# Patient Record
Sex: FEMALE | HISPANIC OR LATINO | Employment: FULL TIME | URBAN - METROPOLITAN AREA
[De-identification: names, ages, dates, MRNs, and addresses within clinical notes are randomized per-mention and may not be internally consistent; named-entity substitution may affect disease eponyms.]

---

## 2022-03-25 ENCOUNTER — OCCMED (OUTPATIENT)
Dept: URGENT CARE | Facility: CLINIC | Age: 42
End: 2022-03-25

## 2022-03-25 ENCOUNTER — APPOINTMENT (OUTPATIENT)
Dept: LAB | Facility: CLINIC | Age: 42
End: 2022-03-25

## 2022-03-25 DIAGNOSIS — Z02.1 PRE-EMPLOYMENT EXAMINATION: Primary | ICD-10-CM

## 2022-03-25 DIAGNOSIS — Z02.1 PRE-EMPLOYMENT EXAMINATION: ICD-10-CM

## 2022-03-25 LAB — RUBV IGG SERPL IA-ACNC: 135.4 IU/ML

## 2022-03-25 PROCEDURE — 86735 MUMPS ANTIBODY: CPT

## 2022-03-25 PROCEDURE — 86765 RUBEOLA ANTIBODY: CPT

## 2022-03-25 PROCEDURE — 86787 VARICELLA-ZOSTER ANTIBODY: CPT

## 2022-03-25 PROCEDURE — 86762 RUBELLA ANTIBODY: CPT

## 2022-03-25 PROCEDURE — 36415 COLL VENOUS BLD VENIPUNCTURE: CPT

## 2022-03-25 PROCEDURE — 86480 TB TEST CELL IMMUN MEASURE: CPT

## 2022-03-27 LAB — VZV IGG SER IA-ACNC: NORMAL

## 2022-03-28 LAB
GAMMA INTERFERON BACKGROUND BLD IA-ACNC: 0.04 IU/ML
M TB IFN-G BLD-IMP: NEGATIVE
M TB IFN-G CD4+ BCKGRND COR BLD-ACNC: -0.02 IU/ML
M TB IFN-G CD4+ BCKGRND COR BLD-ACNC: 0 IU/ML
MEV IGG SER QL: NORMAL
MITOGEN IGNF BCKGRD COR BLD-ACNC: >10 IU/ML
MUV IGG SER QL: ABNORMAL

## 2022-08-18 ENCOUNTER — OFFICE VISIT (OUTPATIENT)
Dept: OBGYN CLINIC | Facility: CLINIC | Age: 42
End: 2022-08-18

## 2022-08-18 VITALS
SYSTOLIC BLOOD PRESSURE: 128 MMHG | BODY MASS INDEX: 31.62 KG/M2 | HEART RATE: 76 BPM | WEIGHT: 171.8 LBS | HEIGHT: 62 IN | DIASTOLIC BLOOD PRESSURE: 87 MMHG

## 2022-08-18 DIAGNOSIS — Z72.51 HIGH RISK HETEROSEXUAL BEHAVIOR: ICD-10-CM

## 2022-08-18 DIAGNOSIS — B96.89 BACTERIAL VAGINOSIS: Primary | ICD-10-CM

## 2022-08-18 DIAGNOSIS — N76.0 BACTERIAL VAGINOSIS: Primary | ICD-10-CM

## 2022-08-18 DIAGNOSIS — R10.2 SUPRAPUBIC ABDOMINAL PAIN: ICD-10-CM

## 2022-08-18 DIAGNOSIS — R39.9 UTI SYMPTOMS: ICD-10-CM

## 2022-08-18 LAB
SL AMB  POCT GLUCOSE, UA: NEGATIVE
SL AMB LEUKOCYTE ESTERASE,UA: NEGATIVE
SL AMB POCT BILIRUBIN,UA: NEGATIVE
SL AMB POCT BLOOD,UA: ABNORMAL
SL AMB POCT CLARITY,UA: ABNORMAL
SL AMB POCT COLOR,UA: ABNORMAL
SL AMB POCT KETONES,UA: NEGATIVE
SL AMB POCT NITRITE,UA: NEGATIVE
SL AMB POCT PH,UA: 5
SL AMB POCT SPECIFIC GRAVITY,UA: 1.03
SL AMB POCT URINE PROTEIN: NEGATIVE
SL AMB POCT UROBILINOGEN: 0.2

## 2022-08-18 PROCEDURE — 81002 URINALYSIS NONAUTO W/O SCOPE: CPT | Performed by: OBSTETRICS & GYNECOLOGY

## 2022-08-18 PROCEDURE — 87491 CHLMYD TRACH DNA AMP PROBE: CPT | Performed by: OBSTETRICS & GYNECOLOGY

## 2022-08-18 PROCEDURE — 87563 M. GENITALIUM AMP PROBE: CPT | Performed by: OBSTETRICS & GYNECOLOGY

## 2022-08-18 PROCEDURE — 87661 TRICHOMONAS VAGINALIS AMPLIF: CPT | Performed by: OBSTETRICS & GYNECOLOGY

## 2022-08-18 PROCEDURE — 87210 SMEAR WET MOUNT SALINE/INK: CPT | Performed by: OBSTETRICS & GYNECOLOGY

## 2022-08-18 PROCEDURE — 87591 N.GONORRHOEAE DNA AMP PROB: CPT | Performed by: OBSTETRICS & GYNECOLOGY

## 2022-08-18 PROCEDURE — 99213 OFFICE O/P EST LOW 20 MIN: CPT | Performed by: OBSTETRICS & GYNECOLOGY

## 2022-08-18 PROCEDURE — 87086 URINE CULTURE/COLONY COUNT: CPT | Performed by: OBSTETRICS & GYNECOLOGY

## 2022-08-18 RX ORDER — HYDROCODONE BITARTRATE AND ACETAMINOPHEN 5; 325 MG/1; MG/1
TABLET ORAL
COMMUNITY
Start: 2022-03-04

## 2022-08-18 RX ORDER — ALBUTEROL SULFATE 0.63 MG/3ML
0.63 SOLUTION RESPIRATORY (INHALATION) EVERY 6 HOURS PRN
COMMUNITY
Start: 2021-10-28 | End: 2022-10-28

## 2022-08-18 RX ORDER — NITROFURANTOIN 25; 75 MG/1; MG/1
100 CAPSULE ORAL 2 TIMES DAILY
Qty: 10 CAPSULE | Refills: 0 | Status: SHIPPED | OUTPATIENT
Start: 2022-08-18 | End: 2022-08-18 | Stop reason: CLARIF

## 2022-08-18 RX ORDER — METRONIDAZOLE 500 MG/1
500 TABLET ORAL EVERY 12 HOURS SCHEDULED
Qty: 14 TABLET | Refills: 0 | Status: SHIPPED | OUTPATIENT
Start: 2022-08-18 | End: 2022-08-25

## 2022-08-18 RX ORDER — CEFUROXIME AXETIL 250 MG/1
TABLET ORAL
COMMUNITY
Start: 2022-06-10

## 2022-08-18 RX ORDER — AMOXICILLIN AND CLAVULANATE POTASSIUM 500; 125 MG/1; MG/1
1 TABLET, FILM COATED ORAL 2 TIMES DAILY
COMMUNITY

## 2022-08-18 NOTE — PROGRESS NOTES
[address]  [facility] OB/GYN VISIT  8/18/2022    Subjective:   Esperanza Arauz is a 39 y o  [id number] with 1 week of symptoms of suprapubic discomfort, worse during intercourse  She reports she gets frequent UTIs and bacterial vaginosis (BV) infections  She can usually tell she has BV by the malodorous vaginal discharge  Since her symptoms began she feels like she is not completely emptying her bladder  She doesn't have dysuria or frequency however  No fevers or chills  She has good vulvovaginal hygiene practices  S/p bilateral tubal ligation for contraception  Objective:  Vitals: Blood pressure 128/87, pulse 76, height 5' 2" (1 575 m), weight 77 9 kg (171 lb 12 8 oz), last menstrual period 07/24/2022  Body mass index is 31 42 kg/m²  Physical Exam  Vitals reviewed  Abdominal:      General: There is no distension  Palpations: Abdomen is soft  Tenderness: There is no abdominal tenderness  There is no guarding or rebound  Genitourinary:     Comments: Normal external female genitalia  No evidence of excoriations to suggest chronic scratching  Cervix is multiparous in appearance with no lesions noted  Off white vaginal discharge coating vaginal walls  Skin:     General: Skin is warm and dry  Neurological:      General: No focal deficit present  Mental Status: She is oriented to person, place, and time     Psychiatric:         Mood and Affect: Mood normal          Behavior: Behavior normal        Recent Results (from the past 24 hour(s))   POCT urine dip    Collection Time: 08/18/22  9:06 AM   Result Value Ref Range    LEUKOCYTE ESTERASE,UA Negative     NITRITE,UA Negative     SL AMB POCT UROBILINOGEN 0 2     POCT URINE PROTEIN Negative      PH,UA 5 0     BLOOD,UA Moderate     SPECIFIC GRAVITY,UA 1 030     KETONES,UA Negative     BILIRUBIN,UA Negative     GLUCOSE, UA Negative      COLOR,UA Dark Yellow     CLARITY,UA Cloudy    Chlamydia/GC amplified DNA by PCR    Collection Time: 08/18/22  9:49 AM Specimen: Cervix   Result Value Ref Range    N gonorrhoeae, DNA Probe Negative Negative    Chlamydia trachomatis, DNA Probe Negative Negative   POCT wet mount    Collection Time: 08/19/22  8:35 AM   Result Value Ref Range    WET MOUNT positive     Yeast, Wet Prep negative     pH 5     Whiff Test positive     Clue Cells present     Trich, Wet Prep negative          Assessment/Plan:  41yo multiparous female with bacterial vaginosis infection and possible UTI  Treat BV now with Metronidazole 500mg BID x 7 days  Await results of urine culture, gonorrhea/chlamydia and trichomoniasis  Problem List Items Addressed This Visit    None     Visit Diagnoses     Bacterial vaginosis    -  Primary    Relevant Medications    amoxicillin-clavulanate (AUGMENTIN) 500-125 mg per tablet    cefuroxime (CEFTIN) 250 mg tablet    metroNIDAZOLE (FLAGYL) 500 mg tablet    UTI symptoms        Relevant Orders    POCT urine dip (Completed)    Urine culture    Suprapubic abdominal pain        Relevant Orders    Urine culture    High risk heterosexual behavior        Relevant Orders    Chlamydia/GC amplified DNA by PCR    Trichomonas vaginalis/Mycoplasma genitalium PCR          Patient discussed with Dr Idania Landry MD  PGY III, OB/GYN  8/18/2022, 12:11 PM

## 2022-08-19 LAB
BV WHIFF TEST VAG QL: POSITIVE
C TRACH DNA SPEC QL NAA+PROBE: NEGATIVE
CLUE CELLS SPEC QL WET PREP: PRESENT
M GENITALIUM DNA SPEC QL NAA+PROBE: NEGATIVE
N GONORRHOEA DNA SPEC QL NAA+PROBE: NEGATIVE
PH SMN: 5 [PH]
SL AMB POCT WET MOUNT: POSITIVE
T VAGINALIS DNA SPEC QL NAA+PROBE: NEGATIVE
T VAGINALIS VAG QL WET PREP: NEGATIVE
YEAST VAG QL WET PREP: NEGATIVE

## 2022-08-20 LAB — BACTERIA UR CULT: NORMAL

## 2023-03-17 ENCOUNTER — OFFICE VISIT (OUTPATIENT)
Dept: OBGYN CLINIC | Facility: CLINIC | Age: 43
End: 2023-03-17

## 2023-03-17 VITALS
BODY MASS INDEX: 30.73 KG/M2 | DIASTOLIC BLOOD PRESSURE: 84 MMHG | HEIGHT: 62 IN | WEIGHT: 167 LBS | SYSTOLIC BLOOD PRESSURE: 119 MMHG | HEART RATE: 78 BPM

## 2023-03-17 DIAGNOSIS — B96.89 BV (BACTERIAL VAGINOSIS): Primary | ICD-10-CM

## 2023-03-17 DIAGNOSIS — N30.01 ACUTE CYSTITIS WITH HEMATURIA: ICD-10-CM

## 2023-03-17 DIAGNOSIS — N76.0 BV (BACTERIAL VAGINOSIS): Primary | ICD-10-CM

## 2023-03-17 DIAGNOSIS — R39.15 URGENCY OF URINATION: ICD-10-CM

## 2023-03-17 DIAGNOSIS — N89.8 VAGINAL DISCHARGE: ICD-10-CM

## 2023-03-17 LAB
BV WHIFF TEST VAG QL: POSITIVE
CLUE CELLS SPEC QL WET PREP: POSITIVE
PH SMN: 5.5 [PH]
SL AMB  POCT GLUCOSE, UA: NEGATIVE
SL AMB LEUKOCYTE ESTERASE,UA: ABNORMAL
SL AMB POCT BILIRUBIN,UA: NEGATIVE
SL AMB POCT BLOOD,UA: ABNORMAL
SL AMB POCT CLARITY,UA: ABNORMAL
SL AMB POCT COLOR,UA: YELLOW
SL AMB POCT KETONES,UA: NEGATIVE
SL AMB POCT NITRITE,UA: NEGATIVE
SL AMB POCT PH,UA: 6
SL AMB POCT SPECIFIC GRAVITY,UA: 1.02
SL AMB POCT URINE PROTEIN: NEGATIVE
SL AMB POCT UROBILINOGEN: 0.2
SL AMB POCT WET MOUNT: ABNORMAL
T VAGINALIS VAG QL WET PREP: NEGATIVE
YEAST VAG QL WET PREP: NEGATIVE

## 2023-03-17 RX ORDER — NITROFURANTOIN 25; 75 MG/1; MG/1
100 CAPSULE ORAL 2 TIMES DAILY
Qty: 10 CAPSULE | Refills: 0 | Status: SHIPPED | OUTPATIENT
Start: 2023-03-17 | End: 2023-03-22

## 2023-03-17 RX ORDER — MULTIVITAMIN
1 TABLET ORAL DAILY
COMMUNITY

## 2023-03-17 RX ORDER — CHLORAL HYDRATE 500 MG
2000 CAPSULE ORAL DAILY
COMMUNITY

## 2023-03-17 RX ORDER — METRONIDAZOLE 500 MG/1
500 TABLET ORAL 2 TIMES DAILY
Qty: 14 TABLET | Refills: 0 | Status: SHIPPED | OUTPATIENT
Start: 2023-03-17 | End: 2023-03-24

## 2023-03-17 RX ORDER — LANOLIN ALCOHOL/MO/W.PET/CERES
2000 CREAM (GRAM) TOPICAL DAILY
COMMUNITY

## 2023-03-17 RX ORDER — MULTIVIT-MIN/IRON/FOLIC ACID/K 18-600-40
2000 CAPSULE ORAL
COMMUNITY

## 2023-03-17 NOTE — PROGRESS NOTES
PROBLEM GYNECOLOGICAL VISIT    Jose E Mcgarry is a 43 y o  female who presents today with complaint of possible UTI and vaginitis  Her general medical history has been reviewed and she reports it as follows:    History reviewed  No pertinent past medical history  Past Surgical History:   Procedure Laterality Date   • BREAST CYST EXCISION Left    • CERVIX LESION DESTRUCTION     • TUBAL LIGATION       OB History        5    Para   3    Term   3            AB   2    Living   3       SAB        IAB   2    Ectopic        Multiple        Live Births   3               Social History     Tobacco Use   • Smoking status: Former     Types: Cigarettes     Quit date: 3/13/2023     Years since quittin 0   • Smokeless tobacco: Former   Vaping Use   • Vaping Use: Never used   Substance Use Topics   • Alcohol use: Yes     Comment: Occasionally   • Drug use: Never     Social History     Substance and Sexual Activity   Sexual Activity Yes   • Partners: Male   • Birth control/protection: None       Current Outpatient Medications   Medication Instructions   • amoxicillin-clavulanate (AUGMENTIN) 500-125 mg per tablet 1 tablet, 2 times daily   • cefuroxime (CEFTIN) 250 mg tablet TAKE 1 TABLET BY MOUTH TWICE A DAY UNTIL DONE   • fish oil 2,000 mg, Oral, Daily   • HYDROcodone-acetaminophen (NORCO) 5-325 mg per tablet For moderate to severe pain, take 1 tab prn q6 hours day 1; 1 tab prn q 12 hours days 2; 1 tab prn once day 3    • metroNIDAZOLE (FLAGYL) 500 mg, Oral, 2 times daily   • Multiple Vitamin (multivitamin) tablet 1 tablet, Oral, Daily   • Multiple Vitamins-Minerals (ONE DAILY MULTIVITAMIN WOMEN PO) Oral   • nitrofurantoin (MACROBID) 100 mg, Oral, 2 times daily   • tuberculin 5 units/0 1 mL 0 1 mL   • vitamin B-12 (VITAMIN B-12) 2,000 mcg, Oral, Daily   • Vitamin D 2,000 Units, Oral       History of Present Illness:   Lex Curtis presents today reporting a few day history of suspected UTI and BV   She has a history of these infections in the past  She reports a milky white discharge with a foul fishy odor  She also has urinary frequency/urgency, feels that as soon as she voids she immediately needs to go again  Has the sensation of incomplete bladder emptying  No dysuria, flank pain, fever  She reports that the day prior to the symptoms starting she used a new scented soap on her vulva, believes this caused the infection as she has had issues with vaginitis/UTI in the past after using products with fragrance  Review of Systems:  Review of Systems   Constitutional: Negative  Gastrointestinal: Negative  Genitourinary: Positive for frequency, urgency and vaginal discharge  Physical Exam:  /84 (BP Location: Right arm, Patient Position: Sitting, Cuff Size: Large)   Pulse 78   Ht 5' 2" (1 575 m)   Wt 75 8 kg (167 lb)   LMP 02/25/2023 (Approximate)   BMI 30 54 kg/m²   Physical Exam  Constitutional:       General: She is not in acute distress  Appearance: Normal appearance  Genitourinary:      Vulva normal       No lesions in the vagina  Vaginal discharge present  Right Adnexa: not tender, not full and no mass present  Left Adnexa: not tender, not full and no mass present  No cervical motion tenderness or lesion  Abdominal:      Tenderness: There is no right CVA tenderness or left CVA tenderness  Neurological:      Mental Status: She is alert  Skin:     General: Skin is warm and dry  Psychiatric:         Mood and Affect: Mood normal          Behavior: Behavior normal    Vitals reviewed  Point of Care Testing:   -Wet mount: +clue cells, -yeast, -trich   -KOH mount: -yeast   -Whiff: positive    -pH 5 5   -urine dip: moderate leuks, small blood, dark/cloudy     Assessment:   1  Bacterial vaginosis    2  Acute cystitis     Plan:   1  Rx for flagy for BV, Macrobid for UTI      2  Reviewed vulvar skin care meausres, encouraged to avoid all scented products to the area     3  Warning signs reviewed    4  Return for annual exam or sooner if needed     Reviewed with patient that test results are available in MyChart immediately, but that they will not necessarily be reviewed by me immediately  Explained that I will review results at my earliest opportunity and contact patient appropriately

## 2023-03-19 LAB — BACTERIA UR CULT: ABNORMAL

## 2023-08-04 ENCOUNTER — NURSE TRIAGE (OUTPATIENT)
Dept: OTHER | Facility: OTHER | Age: 43
End: 2023-08-04

## 2023-08-04 NOTE — TELEPHONE ENCOUNTER
Triage RN has been unsuccessful to follow up with patient for report of pelvic pain and discomfort. Please follow up with patient. Reason for Disposition  • Third attempt to contact caller AND no contact made. Phone number verified.     Protocols used: NO CONTACT OR DUPLICATE CONTACT CALL-ADULT-[place]

## 2023-08-04 NOTE — TELEPHONE ENCOUNTER
Regarding: pelvic discomfort and sharp pains  ----- Message from Marie Flores sent at 8/4/2023  7:49 AM EDT -----  "I have been having pelvic discomfort and sharp pains. "

## 2023-08-08 ENCOUNTER — APPOINTMENT (EMERGENCY)
Dept: RADIOLOGY | Facility: HOSPITAL | Age: 43
End: 2023-08-08
Payer: COMMERCIAL

## 2023-08-08 ENCOUNTER — HOSPITAL ENCOUNTER (EMERGENCY)
Facility: HOSPITAL | Age: 43
Discharge: HOME | End: 2023-08-09
Attending: EMERGENCY MEDICINE | Admitting: EMERGENCY MEDICINE
Payer: COMMERCIAL

## 2023-08-08 VITALS
HEART RATE: 55 BPM | RESPIRATION RATE: 16 BRPM | SYSTOLIC BLOOD PRESSURE: 122 MMHG | TEMPERATURE: 97.1 F | DIASTOLIC BLOOD PRESSURE: 77 MMHG | OXYGEN SATURATION: 96 %

## 2023-08-08 DIAGNOSIS — N72 CERVICITIS: Primary | ICD-10-CM

## 2023-08-08 LAB
ALBUMIN SERPL BCP-MCNC: 3.4 G/DL (ref 3.5–5)
ALP SERPL-CCNC: 83 U/L (ref 46–116)
ALT SERPL W P-5'-P-CCNC: 31 U/L (ref 12–78)
ANION GAP SERPL CALCULATED.3IONS-SCNC: 4 MMOL/L
AST SERPL W P-5'-P-CCNC: 17 U/L (ref 5–45)
ATRIAL RATE: 58 BPM
B-HCG SERPL-ACNC: <1 MIU/ML (ref 0–5)
BACTERIA UR QL AUTO: ABNORMAL /HPF
BASOPHILS # BLD AUTO: 0.04 THOUSANDS/ÂΜL (ref 0–0.1)
BASOPHILS NFR BLD AUTO: 1 % (ref 0–1)
BILIRUB SERPL-MCNC: 0.18 MG/DL (ref 0.2–1)
BILIRUB UR QL STRIP: NEGATIVE
BUN SERPL-MCNC: 12 MG/DL (ref 5–25)
CALCIUM ALBUM COR SERPL-MCNC: 9.3 MG/DL (ref 8.3–10.1)
CALCIUM SERPL-MCNC: 8.8 MG/DL (ref 8.3–10.1)
CHLORIDE SERPL-SCNC: 111 MMOL/L (ref 96–108)
CLARITY UR: CLEAR
CO2 SERPL-SCNC: 25 MMOL/L (ref 21–32)
COLOR UR: YELLOW
CREAT SERPL-MCNC: 0.67 MG/DL (ref 0.6–1.3)
EOSINOPHIL # BLD AUTO: 0.13 THOUSAND/ÂΜL (ref 0–0.61)
EOSINOPHIL NFR BLD AUTO: 2 % (ref 0–6)
ERYTHROCYTE [DISTWIDTH] IN BLOOD BY AUTOMATED COUNT: 12.4 % (ref 11.6–15.1)
EXT PREGNANCY TEST URINE: NEGATIVE
EXT. CONTROL: NORMAL
GFR SERPL CREATININE-BSD FRML MDRD: 108 ML/MIN/1.73SQ M
GLUCOSE SERPL-MCNC: 126 MG/DL (ref 65–140)
GLUCOSE UR STRIP-MCNC: NEGATIVE MG/DL
HCT VFR BLD AUTO: 38.1 % (ref 34.8–46.1)
HGB BLD-MCNC: 12.9 G/DL (ref 11.5–15.4)
HGB UR QL STRIP.AUTO: ABNORMAL
IMM GRANULOCYTES # BLD AUTO: 0.02 THOUSAND/UL (ref 0–0.2)
IMM GRANULOCYTES NFR BLD AUTO: 0 % (ref 0–2)
KETONES UR STRIP-MCNC: NEGATIVE MG/DL
LEUKOCYTE ESTERASE UR QL STRIP: NEGATIVE
LYMPHOCYTES # BLD AUTO: 2.76 THOUSANDS/ÂΜL (ref 0.6–4.47)
LYMPHOCYTES NFR BLD AUTO: 42 % (ref 14–44)
MCH RBC QN AUTO: 31.3 PG (ref 26.8–34.3)
MCHC RBC AUTO-ENTMCNC: 33.9 G/DL (ref 31.4–37.4)
MCV RBC AUTO: 93 FL (ref 82–98)
MONOCYTES # BLD AUTO: 0.67 THOUSAND/ÂΜL (ref 0.17–1.22)
MONOCYTES NFR BLD AUTO: 10 % (ref 4–12)
MUCOUS THREADS UR QL AUTO: ABNORMAL
NEUTROPHILS # BLD AUTO: 3.03 THOUSANDS/ÂΜL (ref 1.85–7.62)
NEUTS SEG NFR BLD AUTO: 45 % (ref 43–75)
NITRITE UR QL STRIP: NEGATIVE
NON-SQ EPI CELLS URNS QL MICRO: ABNORMAL /HPF
NRBC BLD AUTO-RTO: 0 /100 WBCS
P AXIS: 44 DEGREES
PH UR STRIP.AUTO: 6.5 [PH]
PLATELET # BLD AUTO: 322 THOUSANDS/UL (ref 149–390)
PMV BLD AUTO: 8.7 FL (ref 8.9–12.7)
POTASSIUM SERPL-SCNC: 3.5 MMOL/L (ref 3.5–5.3)
PR INTERVAL: 136 MS
PROT SERPL-MCNC: 6.9 G/DL (ref 6.4–8.4)
PROT UR STRIP-MCNC: NEGATIVE MG/DL
QRS AXIS: 24 DEGREES
QRSD INTERVAL: 82 MS
QT INTERVAL: 406 MS
QTC INTERVAL: 398 MS
RBC # BLD AUTO: 4.12 MILLION/UL (ref 3.81–5.12)
RBC #/AREA URNS AUTO: ABNORMAL /HPF
SODIUM SERPL-SCNC: 140 MMOL/L (ref 135–147)
SP GR UR STRIP.AUTO: 1.02 (ref 1–1.03)
T WAVE AXIS: 28 DEGREES
UROBILINOGEN UR STRIP-ACNC: <2 MG/DL
VENTRICULAR RATE: 58 BPM
WBC # BLD AUTO: 6.65 THOUSAND/UL (ref 4.31–10.16)
WBC #/AREA URNS AUTO: ABNORMAL /HPF

## 2023-08-08 PROCEDURE — 85025 COMPLETE CBC W/AUTO DIFF WBC: CPT

## 2023-08-08 PROCEDURE — 74177 CT ABD & PELVIS W/CONTRAST: CPT

## 2023-08-08 PROCEDURE — 87591 N.GONORRHOEAE DNA AMP PROB: CPT

## 2023-08-08 PROCEDURE — 84702 CHORIONIC GONADOTROPIN TEST: CPT

## 2023-08-08 PROCEDURE — 96374 THER/PROPH/DIAG INJ IV PUSH: CPT

## 2023-08-08 PROCEDURE — 80053 COMPREHEN METABOLIC PANEL: CPT

## 2023-08-08 PROCEDURE — 93005 ELECTROCARDIOGRAM TRACING: CPT

## 2023-08-08 PROCEDURE — 87491 CHLMYD TRACH DNA AMP PROBE: CPT

## 2023-08-08 PROCEDURE — 81001 URINALYSIS AUTO W/SCOPE: CPT

## 2023-08-08 PROCEDURE — 93010 ELECTROCARDIOGRAM REPORT: CPT | Performed by: INTERNAL MEDICINE

## 2023-08-08 PROCEDURE — 36415 COLL VENOUS BLD VENIPUNCTURE: CPT

## 2023-08-08 PROCEDURE — G1004 CDSM NDSC: HCPCS

## 2023-08-08 PROCEDURE — 99285 EMERGENCY DEPT VISIT HI MDM: CPT | Performed by: EMERGENCY MEDICINE

## 2023-08-08 PROCEDURE — 81025 URINE PREGNANCY TEST: CPT

## 2023-08-08 PROCEDURE — 99284 EMERGENCY DEPT VISIT MOD MDM: CPT

## 2023-08-08 RX ORDER — DOXYCYCLINE HYCLATE 100 MG/1
100 CAPSULE ORAL ONCE
Status: COMPLETED | OUTPATIENT
Start: 2023-08-08 | End: 2023-08-08

## 2023-08-08 RX ORDER — ACETAMINOPHEN 325 MG/1
975 TABLET ORAL ONCE
Status: COMPLETED | OUTPATIENT
Start: 2023-08-08 | End: 2023-08-08

## 2023-08-08 RX ORDER — KETOROLAC TROMETHAMINE 30 MG/ML
15 INJECTION, SOLUTION INTRAMUSCULAR; INTRAVENOUS ONCE
Status: COMPLETED | OUTPATIENT
Start: 2023-08-08 | End: 2023-08-08

## 2023-08-08 RX ADMIN — ACETAMINOPHEN 975 MG: 325 TABLET, FILM COATED ORAL at 21:08

## 2023-08-08 RX ADMIN — DOXYCYCLINE 100 MG: 100 CAPSULE ORAL at 23:49

## 2023-08-08 RX ADMIN — IOHEXOL 97 ML: 350 INJECTION, SOLUTION INTRAVENOUS at 22:06

## 2023-08-08 RX ADMIN — KETOROLAC TROMETHAMINE 15 MG: 30 INJECTION, SOLUTION INTRAMUSCULAR; INTRAVENOUS at 21:08

## 2023-08-09 LAB
C TRACH DNA SPEC QL NAA+PROBE: NEGATIVE
N GONORRHOEA DNA SPEC QL NAA+PROBE: NEGATIVE

## 2023-08-09 PROCEDURE — 96372 THER/PROPH/DIAG INJ SC/IM: CPT

## 2023-08-09 RX ORDER — NAPROXEN 500 MG/1
500 TABLET ORAL 2 TIMES DAILY WITH MEALS
Qty: 14 TABLET | Refills: 0 | Status: SHIPPED | OUTPATIENT
Start: 2023-08-09 | End: 2023-08-16

## 2023-08-09 RX ORDER — DOXYCYCLINE HYCLATE 100 MG/1
100 CAPSULE ORAL 2 TIMES DAILY
Qty: 14 CAPSULE | Refills: 0 | Status: SHIPPED | OUTPATIENT
Start: 2023-08-09 | End: 2023-08-16

## 2023-08-09 RX ADMIN — CEFTRIAXONE 500 MG: 1 INJECTION, POWDER, FOR SOLUTION INTRAMUSCULAR; INTRAVENOUS at 00:10

## 2023-08-09 NOTE — DISCHARGE INSTRUCTIONS
Your evaluation suggests that your symptoms are due to a non emergent cause, most consistent with cervicitis. Please follow up with your OBGYN within two days. Take antibiotics as prescribed. Return to the Emergency Department if you experience worsening or concerning symptoms. Thank you for choosing us for your care.

## 2023-08-09 NOTE — ED ATTENDING ATTESTATION
8/8/2023  IPrecious Friday, DO, saw and evaluated the patient. I have discussed the patient with the resident/non-physician practitioner and agree with the resident's/non-physician practitioner's findings, Plan of Care, and MDM as documented in the resident's/non-physician practitioner's note, except where noted. All available labs and Radiology studies were reviewed. I was present for key portions of any procedure(s) performed by the resident/non-physician practitioner and I was immediately available to provide assistance. At this point I agree with the current assessment done in the Emergency Department. I have conducted an independent evaluation of this patient a history and physical is as follows:    Patient is a 44-year-old female history of prior bacterial vaginosis, previous tubal ligation, says that about 2 days ago she began having some mild left sided abdominal pain, lower abdominal, No dysuria hematuria, no vaginal bleeding or discharge. No dyspareunia. No nausea, no vomiting, no diarrhea constipation. Pain is mild in severity. Patient says when the pain gets worse she feels short of breath from the pain but that she does not actually have any short of breath when she does not have pain. There Is no chest pain. General:  Patient is well-appearing  Head:  Atraumatic  Eyes:  Conjunctiva pink  ENT:  Mucous membranes are moist  Neck:  Supple  Cardiac:  S1-S2, without murmurs  Lungs:  Clear to auscultation bilaterally  Abdomen: Very slightly low left abdominal tenderness, no tympany, no rigidity, no guarding, no CVA tenderness. Extremities:  Normal range of motion  Neurologic:  Awake, fluent speech, normal comprehension, AAOx3  Skin:  Pink warm and dry  Psychiatric:  Alert, pleasant, cooperative      ED Course       CT abdomen pelvis with contrast   Final Result      No acute CT findings.             Workstation performed: [id number]             Labs Reviewed   CBC AND DIFFERENTIAL - Abnormal Result Value Ref Range Status    WBC 6.65  4.31 - 10.16 Thousand/uL Final    RBC 4.12  3.81 - 5.12 Million/uL Final    Hemoglobin 12.9  11.5 - 15.4 g/dL Final    Hematocrit 38.1  34.8 - 46.1 % Final    MCV 93  82 - 98 fL Final    MCH 31.3  26.8 - 34.3 pg Final    MCHC 33.9  31.4 - 37.4 g/dL Final    RDW 12.4  11.6 - 15.1 % Final    MPV 8.7 (*) 8.9 - 12.7 fL Final    Platelets 067  631 - 390 Thousands/uL Final    nRBC 0  /100 WBCs Final    Neutrophils Relative 45  43 - 75 % Final    Immat GRANS % 0  0 - 2 % Final    Lymphocytes Relative 42  14 - 44 % Final    Monocytes Relative 10  4 - 12 % Final    Eosinophils Relative 2  0 - 6 % Final    Basophils Relative 1  0 - 1 % Final    Neutrophils Absolute 3.03  1.85 - 7.62 Thousands/µL Final    Immature Grans Absolute 0.02  0.00 - 0.20 Thousand/uL Final    Lymphocytes Absolute 2.76  0.60 - 4.47 Thousands/µL Final    Monocytes Absolute 0.67  0.17 - 1.22 Thousand/µL Final    Eosinophils Absolute 0.13  0.00 - 0.61 Thousand/µL Final    Basophils Absolute 0.04  0.00 - 0.10 Thousands/µL Final   COMPREHENSIVE METABOLIC PANEL - Abnormal    Sodium 140  135 - 147 mmol/L Final    Potassium 3.5  3.5 - 5.3 mmol/L Final    Chloride 111 (*) 96 - 108 mmol/L Final    CO2 25  21 - 32 mmol/L Final    ANION GAP 4  mmol/L Final    BUN 12  5 - 25 mg/dL Final    Creatinine 0.67  0.60 - 1.30 mg/dL Final    Comment: Standardized to IDMS reference method    Glucose 126  65 - 140 mg/dL Final    Comment: If the patient is fasting, the ADA then defines impaired fasting glucose as > 100 mg/dL and diabetes as > or equal to 123 mg/dL. Specimen collection should occur prior to Sulfasalazine administration due to the potential for falsely depressed results. Specimen collection should occur prior to Sulfapyridine administration due to the potential for falsely elevated results.     Calcium 8.8  8.3 - 10.1 mg/dL Final    Corrected Calcium 9.3  8.3 - 10.1 mg/dL Final    AST 17  5 - 45 U/L Final Comment: Specimen collection should occur prior to Sulfasalazine administration due to the potential for falsely depressed results. ALT 31  12 - 78 U/L Final    Comment: Specimen collection should occur prior to Sulfasalazine and/or Sulfapyridine administration due to the potential for falsely depressed results. Alkaline Phosphatase 83  46 - 116 U/L Final    Total Protein 6.9  6.4 - 8.4 g/dL Final    Albumin 3.4 (*) 3.5 - 5.0 g/dL Final    Total Bilirubin 0.18 (*) 0.20 - 1.00 mg/dL Final    Comment: Use of this assay is not recommended for patients undergoing treatment with eltrombopag due to the potential for falsely elevated results.     eGFR 108  ml/min/1.73sq m Final    Narrative:     National Kid[employer] Disease Foundation guidelines for Chronic Kidney Disease (CKD):   •  Stage 1 with normal or high GFR (GFR > 90 mL/min/1.73 square meters)  •  Stage 2 Mild CKD (GFR = 60-89 mL/min/1.73 square meters)  •  Stage 3A Moderate CKD (GFR = 45-59 mL/min/1.73 square meters)  •  Stage 3B Moderate CKD (GFR = 30-44 mL/min/1.73 square meters)  •  Stage 4 Severe CKD (GFR = 15-29 mL/min/1.73 square meters)  •  Stage 5 End Stage CKD (GFR <15 mL/min/1.73 square meters)  Note: GFR calculation is accurate only with a steady state creatinine   UA W REFLEX TO MICROSCOPIC WITH REFLEX TO CULTURE - Abnormal    Color, UA Yellow   Final    Clarity, UA Clear   Final    Specific Gravity, UA 1.025  1.005 - 1.030 Final    pH, UA 6.5  4.5, 5.0, 5.5, 6.0, 6.5, 7.0, 7.5, 8.0 Final    Leukocytes, UA Negative  Negative Final    Nitrite, UA Negative  Negative Final    Protein, UA Negative  Negative mg/dl Final    Glucose, UA Negative  Negative mg/dl Final    Ketones, UA Negative  Negative mg/dl Final    Urobilinogen, UA <2.0  <2.0 mg/dl mg/dl Final    Bilirubin, UA Negative  Negative Final    Occult Blood, UA Small (*) Negative Final   URINE MICROSCOPIC - Abnormal    RBC, UA 10-20 (*) None Seen, 1-2 /hpf Final    WBC, UA None Seen  None Seen, 1-2 /hpf Final    Epithelial Cells Occasional  None Seen, Occasional /hpf Final    Bacteria, UA None Seen  None Seen, Occasional /hpf Final    MUCUS THREADS Occasional (*) None Seen Final   HCG, QUANTITATIVE - Normal    HCG, Quant <1  0 - 5 mIU/mL Final    Narrative:      Expected Ranges:    HCG results between 5 and 25 mIU/mL may be indicative of early pregnancy but should be interpreted in light of the total clinical presentation. HCG can rise to detectable levels in carmina and post menopausal women (0-11.6 mIU/mL). Approximate               Approximate HCG  Gestation age          Concentration ( mIU/mL)  _____________          ______________________   Jaci Fitzpatrick                      HCG values  0.2-1                       5-50  1-2                           2-3                         100-5000  3-4                         [id number]  4-5                         [id number]  5-6                         [id number]  6-8                         [id number]  8-12                        [id number]     POCT PREGNANCY, URINE - Normal    EXT Preg Test, Ur Negative   Final    Control Valid   Final   CHLAMYDIA /GC AMPLIFIED DNA       Pelvic exam performed by ED resident showed a scant amount of brownish discharge, trace cervical tenderness, no other significant abnormalities. At this point the cause the patient's symptoms is unclear but unlikely to represent an acute emergency. No evidence of ectopic pregnancy, no sign of of diverticulitis. No urinary symptoms, no signs of pyelonephritis or UTI. May have an element of cervicitis however no significant discharge and no large amount of cervical motion tenderness. Patient given, treatment for cervicitis, will follow up as outpatient. While the cause of the patient's complaints is most likely benign, it is possible that this is the early presentation of a more serious condition.  This diagnostic uncertainty was discussed with the patient, as was the importance of follow up care, as well as the need to return to immediately return to the closest emergency department for the signs/symptoms in the discharge instruction sheets, or they were otherwise concerned about their medical condition. The patient stated they were aware of this diagnostic uncertainty, understood the importance of follow up and were comfortable being discharged. Supportive care, importance of follow-up and return precautions were discussed with the patient, who expressed understanding. DIAGNOSIS:  Acute nonspecific left lower abdominal pain    MEDICAL DECISION MAKING CODING      Patient presents with acute new problem with:  Threat to life or bodily function      Chronic conditions affecting care: As per HPI    COLLECTION AND INTERPRETATION OF DATA    I ordered each unique test  Tests reviewed personally by me:  Labs: See above  Imaging: I independently reviewed the CT abdomen and pelvis and found no acute pathology. RISK  Drugs (OTC, Rx, Controlled substances): preScription management  All of the patient's current prescription medications should be continued.       Surgery  -I considered surgery may be necessary prior to completion of the work up but afterwards there is no indication for immediate surgery    Social Determinants of Health:  Presentation to ED outside of business hours or on night shift          Critical Care Time  Procedures

## 2023-08-09 NOTE — ED PROVIDER NOTES
History  Chief Complaint   Patient presents with   • Generalized Body Aches     Pt reports sob, tissues in urine, body pain, weakness; started 2 days ago     Patient is a 51-year-old female with a significant past medical history of tubal ligation, presenting for evaluation of lower abdominal pain. She states that over the past 2 days she has been having some lower abdominal cramping type sensation with some radiation into her lower back. She endorses nausea without any vomiting. She also has some dull, generalized headaches. She states that she occasionally feels short of breath while experiencing increase in pain, but is not having any at rest or with exertion. She describes some myalgias as well. She denies any dysuria, hematuria, or flank pain. She denies fevers. She denies sick contacts. She otherwise denies acute complaints. Prior to Admission Medications   Prescriptions Last Dose Informant Patient Reported? Taking? Cholecalciferol (Vitamin D) 50 MCG ( UT) CAPS  Self Yes No   Sig: Take 2,000 Units by mouth   HYDROcodone-acetaminophen (NORCO) 5-325 mg per tablet   Yes No   Sig: For moderate to severe pain, take 1 tab prn q6 hours day 1; 1 tab prn q 12 hours days 2; 1 tab prn once day 3.    Patient not taking: Reported on 2022   Multiple Vitamin (multivitamin) tablet  Self Yes No   Sig: Take 1 tablet by mouth daily   Multiple Vitamins-Minerals (ONE DAILY MULTIVITAMIN WOMEN PO)  Self Yes No   Sig: Take by mouth   Omega-3 Fatty Acids (fish oil) 1,000 mg  Self Yes No   Sig: Take 2,000 mg by mouth daily   amoxicillin-clavulanate (AUGMENTIN) 500-125 mg per tablet   Yes No   Sig: Take 1 tablet by mouth 2 (two) times a day   Patient not taking: Reported on 2022   cefuroxime (CEFTIN) 250 mg tablet   Yes No   Sig: TAKE 1 TABLET BY MOUTH TWICE A DAY UNTIL DONE   Patient not taking: Reported on 2022   tuberculin 5 units/0.1 mL   Yes No   Si.1 mL   Patient not taking: Reported on 2022   vitamin B-12 (VITAMIN B-12) 1,000 mcg tablet  Self Yes No   Sig: Take 2,000 mcg by mouth daily      Facility-Administered Medications: None       No past medical history on file. Past Surgical History:   Procedure Laterality Date   • BREAST CYST EXCISION Left    • CERVIX LESION DESTRUCTION     • TUBAL LIGATION         Family History   Problem Relation Age of Onset   • Hypertension Mother    • Irregular heart beat Mother    • Heart disease Mother    • Liver disease Mother    • Hypertension Father    • Diabetes Father    • Hypothyroidism Sister    • Pancreatitis Sister    • Anxiety disorder Sister    • Depression Sister    • Depression Sister    • Anxiety disorder Sister    • No Known Problems Sister    • Heart disease Brother    • Drug abuse Brother    • No Known Problems Maternal Grandmother    • No Known Problems Maternal Grandfather    • No Known Problems Paternal Grandmother    • No Known Problems Paternal Grandfather    • Breast cancer Cousin    • Breast cancer Cousin      I have reviewed and agree with the history as documented. E-Cigarette/Vaping   • E-Cigarette Use Never User      E-Cigarette/Vaping Substances   • Nicotine No    • THC No    • CBD No    • Flavoring No      Social History     Tobacco Use   • Smoking status: Former     Types: Cigarettes     Quit date: 3/13/2023     Years since quittin.4   • Smokeless tobacco: Former   Vaping Use   • Vaping Use: Never used   Substance Use Topics   • Alcohol use: Yes     Comment: Occasionally   • Drug use: Never        Review of Systems   Constitutional: Negative for fever. Respiratory: Positive for shortness of breath (with pain from abdomen). Gastrointestinal: Positive for abdominal pain and nausea. Negative for diarrhea and vomiting. Musculoskeletal: Positive for myalgias. Neurological: Positive for headaches. All other systems reviewed and are negative.       Physical Exam  ED Triage Vitals   Temperature Pulse Respirations Blood Pressure SpO2   08/08/23 1746 08/08/23 1746 08/08/23 1825 08/08/23 1746 08/08/23 1746   (!) 97.1 °F (36.2 °C) 71 18 119/90 99 %      Temp Source Heart Rate Source Patient Position - Orthostatic VS BP Location FiO2 (%)   08/08/23 1746 08/08/23 1746 08/08/23 1746 08/08/23 1746 --   Oral Monitor Lying Left arm       Pain Score       08/08/23 1746       9             Orthostatic Vital Signs  Vitals:    08/08/23 1746 08/08/23 2229   BP: 119/90 122/77   Pulse: 71 55   Patient Position - Orthostatic VS: Lying Lying       Physical Exam  Vitals and nursing note reviewed. Exam conducted with a chaperone present [facility]). Constitutional:       General: She is not in acute distress. Appearance: Normal appearance. She is not ill-appearing or toxic-appearing. HENT:      Head: Normocephalic and atraumatic. Right Ear: External ear normal.      Left Ear: External ear normal.      Nose: Nose normal.   Eyes:      General: No scleral icterus. Right eye: No discharge. Left eye: No discharge. Extraocular Movements: Extraocular movements intact. Conjunctiva/sclera: Conjunctivae normal.   Cardiovascular:      Rate and Rhythm: Normal rate. Heart sounds: Normal heart sounds. No murmur heard. No friction rub. No gallop. Pulmonary:      Effort: Pulmonary effort is normal. No respiratory distress. Breath sounds: Normal breath sounds. Abdominal:      General: Abdomen is flat. There is no distension. Palpations: Abdomen is soft. There is no mass. Tenderness: There is abdominal tenderness in the suprapubic area. There is no right CVA tenderness, left CVA tenderness or guarding. Genitourinary:     Comments: There is minimal blood in the vaginal vault. No significant discharge noted. Minimal cervical motion tenderness. Skin:     General: Skin is warm and dry. Neurological:      General: No focal deficit present. Mental Status: She is alert. ED Medications  Medications   ketorolac (TORADOL) injection 15 mg (15 mg Intravenous Given 8/8/23 2108)   acetaminophen (TYLENOL) tablet 975 mg (975 mg Oral Given 8/8/23 2108)   iohexol (OMNIPAQUE) 350 MG/ML injection (MULTI-DOSE) 100 mL (97 mL Intravenous Given 8/8/23 2206)   doxycycline hyclate (VIBRAMYCIN) capsule 100 mg (100 mg Oral Given 8/8/23 2349)   cefTRIAXone (ROCEPHIN) 500 mg in sterile water IM only syringe (500 mg Intramuscular Given 8/9/23 0010)       Diagnostic Studies  Results Reviewed     Procedure Component Value Units Date/Time    Chlamydia/GC amplified DNA by PCR [[id number]]  (Normal) Collected: 08/08/23 2336    Lab Status: Final result Specimen: Urine, Other Updated: 08/09/23 1539     N gonorrhoeae, DNA Probe Negative     Chlamydia trachomatis, DNA Probe Negative    Narrative: This test was performed using the FDA-approved Jackie 6800 CT/NG assay (Roche [employer]). This test uses real-time PCR to detect Chlamydia trachomatis (CT) and Neisseria gonorrhoeae (NG). This instrument and assay have been validated by the  and performing laboratory and verified by the performing laboratory. This test is intended as an aid in the diagnosis of chlamydial and gonococcal disease. This test has not been evaluated in patients younger than 15years of age and is not recommended for evaluation of suspected sexual abuse. This assay is not intended to replace other exams or tests for diagnosis of urogenital infection by causative factors other than Chalmydia trachomatis (CT) and Neisseria gonorrhoeae (NG). Additional testing is recommended when the results do not correlate with clinical signs and symptoms.    Procedural Limitations  This assay has only been validated for use with male and female urine, clinician-instructed self-collected vaginal swab specimens, clinician-collected vaginal swab specimens, endocervical swab specimens collected in jackie® PCR Media and cervical specimens collected in PreservCyt® Solution. Assay performance has not been validated for use with other collection media and/or specimen types. Detection of C. trachomatis and [place] is dependent on the number of organisms present in the specimen. Detection may be affected by specimen collection methods, patient factors, stage of infection, infecting strains, and presence of polymerase/PCR inhibitors. When CT is present at very high concentrations, the detection of NG present at concentrations near the limit of detection may be impacted. The presence of mucus in endocervical specimens may lead to false negative results. The presence of whole blood in urine and cervical specimens collected in PreservCyt Solution may lead to false negative and/or invalid test results. Urine testing is recommended to be performed on first catch urine samples. The effects of other collection variables have not been evaluated at this time. The effects of vaginal discharge, tampon use, douching, and other collection variables have not been evaluated at this time. This assay has not been evaluated with patients currently being treated with antimicrobial agents active against CT or NG, or patients with a history of hysterectomy.      Urine Microscopic [[id number]]  (Abnormal) Collected: 08/08/23 2109    Lab Status: Final result Specimen: Urine, Clean Catch Updated: 08/08/23 2215     RBC, UA 10-20 /hpf      WBC, UA None Seen /hpf      Epithelial Cells Occasional /hpf      Bacteria, UA None Seen /hpf      MUCUS THREADS Occasional    UA w Reflex to Microscopic w Reflex to Culture [[id number]]  (Abnormal) Collected: 08/08/23 2109    Lab Status: Final result Specimen: Urine, Clean Catch Updated: 08/08/23 2204     Color, UA Yellow     Clarity, UA Clear     Specific Gravity, UA 1.025     pH, UA 6.5     Leukocytes, UA Negative     Nitrite, UA Negative     Protein, UA Negative mg/dl      Glucose, UA Negative mg/dl      Ketones, UA Negative mg/dl      Urobilinogen, UA <2.0 mg/dl      Bilirubin, UA Negative     Occult Blood, UA Small    Pregnancy, hCG, quantitative [[id number]]  (Normal) Collected: 08/08/23 2109    Lab Status: Final result Specimen: Blood from Arm, Right Updated: 08/08/23 2155     HCG, Quant <1 mIU/mL     Narrative:       Expected Ranges:    HCG results between 5 and 25 mIU/mL may be indicative of early pregnancy but should be interpreted in light of the total clinical presentation. HCG can rise to detectable levels in [place] and post menopausal women (0-11.6 mIU/mL).      Approximate               Approximate HCG  Gestation age          Concentration ( mIU/mL)  _____________          ______________________   [place]                      HCG values  0.2-1                       5-50  1-2                           2-3                         100-5000  3-4                         500-90479  4-5                         1000-68814  5-6                         [id number]  6-8                         [id number]  8-12                        [id number]      Comprehensive metabolic panel [[id number]]  (Abnormal) Collected: 08/08/23 2109    Lab Status: Final result Specimen: Blood from Arm, Right Updated: 08/08/23 2144     Sodium 140 mmol/L      Potassium 3.5 mmol/L      Chloride 111 mmol/L      CO2 25 mmol/L      ANION GAP 4 mmol/L      BUN 12 mg/dL      Creatinine 0.67 mg/dL      Glucose 126 mg/dL      Calcium 8.8 mg/dL      Corrected Calcium 9.3 mg/dL      AST 17 U/L      ALT 31 U/L      Alkaline Phosphatase 83 U/L      Total Protein 6.9 g/dL      Albumin 3.4 g/dL      Total Bilirubin 0.18 mg/dL      eGFR 108 ml/min/1.73sq m     Narrative:      [place] guidelines for Chronic Kidney Disease (CKD):   •  Stage 1 with normal or high GFR (GFR > 90 mL/min/1.73 square meters)  •  Stage 2 Mild CKD (GFR = 60-89 mL/min/1.73 square meters)  •  Stage 3A Moderate CKD (GFR = 45-59 mL/min/1.73 square meters)  •  Stage 3B Moderate CKD (GFR = 30-44 mL/min/1.73 square meters)  •  Stage 4 Severe CKD (GFR = 15-29 mL/min/1.73 square meters)  •  Stage 5 End Stage CKD (GFR <15 mL/min/1.73 square meters)  Note: GFR calculation is accurate only with a steady state creatinine    CBC and differential [[id number]]  (Abnormal) Collected: 08/08/23 2109    Lab Status: Final result Specimen: Blood from Arm, Right Updated: 08/08/23 2123     WBC 6.65 Thousand/uL      RBC 4.12 Million/uL      Hemoglobin 12.9 g/dL      Hematocrit 38.1 %      MCV 93 fL      MCH 31.3 pg      MCHC 33.9 g/dL      RDW 12.4 %      MPV 8.7 fL      Platelets 223 Thousands/uL      nRBC 0 /100 WBCs      Neutrophils Relative 45 %      Immat GRANS % 0 %      Lymphocytes Relative 42 %      Monocytes Relative 10 %      Eosinophils Relative 2 %      Basophils Relative 1 %      Neutrophils Absolute 3.03 Thousands/µL      Immature Grans Absolute 0.02 Thousand/uL      Lymphocytes Absolute 2.76 Thousands/µL      Monocytes Absolute 0.67 Thousand/µL      Eosinophils Absolute 0.13 Thousand/µL      Basophils Absolute 0.04 Thousands/µL     POCT pregnancy, urine [[id number]]  (Normal) Resulted: 08/08/23 2116    Lab Status: Final result Updated: 08/08/23 2117     EXT Preg Test, Ur Negative     Control Valid                 CT abdomen pelvis with contrast   Final Result by Radha Marquez MD (08/08 2244)      No acute CT findings. Workstation performed: [id number]               Procedures  Procedures      ED Course  ED Course as of 08/10/23 1415   Tue Aug 08, 2023   2144 PREGNANCY TEST URINE: Negative   2200 Procedure Note: EKG  Date/Time: 08/08/23 10:00 PM   Interpreted by: Bony Sierra   Indications / Diagnosis: abdominal pain  ECG reviewed by me, the ED Provider: yes   The EKG demonstrates:  Rhythm: sinus bradycardia with sinus arrhythmia   Intervals: normal intervals  Axis: normal axis  QRS/Blocks: normal QRS  ST Changes: No acute ST Changes, no STD/ANCELMO. Medical Decision Making  Patient is a 41-year-old female presenting for evaluation of lower abdominal pain. Based on history and evaluation, differential diagnosis includes but is not limited to: Urinary tract infection, appendicitis, ectopic pregnancy, intrauterine pregnancy. Plan: CBC, CMP, urinalysis, urine pregnancy, pain control, CT abdomen pelvis    Labs unremarkable. Urinalysis without signs of infection. CT abdomen pelvis without acute emergent intra-abdominal findings on radiology interpretation and my independent review. Abdominal pain remains undifferentiated at this point. Will expand differential to include cervicitis. Performed a pelvic exam as outlined above which did have some mild cervical tenderness so will empirically cover for gonorrhea and chlamydia with ceftriaxone and doxycycline and urine testing for such added onto previously collected urine sample and sent off to lab. Additional doxycycline sent to patient pharmacy. Abdominal pain remains differentiated, however given her cervical motion tenderness suspect likely cervicitis. Does not appear emergent in nature. Not clinically consistent with PID. Patient well-appearing on reassessment. Stable for discharge home with OB/GYN follow-up and to take antibiotics as prescribed. Patient seems understand this plan and is agreeable. All questions answered. Patient discharged home with return precautions. I independently reviewed this patient's chart. I considered her chronic medical conditions in my medical decision making. Amount and/or Complexity of Data Reviewed  Labs: ordered. Decision-making details documented in ED Course. Radiology: ordered. Risk  OTC drugs. Prescription drug management.             Disposition  Final diagnoses:   Cervicitis     Time reflects when diagnosis was documented in both MDM as applicable and the Disposition within this note     Time User Action Codes Description Comment    8/9/2023 12:03 AM Pema Winters Add [N72] Cervicitis       ED Disposition     ED Disposition   Discharge    Condition   Stable    Date/Time   Wed Aug 9, 2023 12:03 AM    Comment   Donna Humera discharge to home/self care. Follow-up Information    None         Discharge Medication List as of 8/9/2023 12:16 AM      START taking these medications    Details   doxycycline hyclate (VIBRAMYCIN) 100 mg capsule Take 1 capsule (100 mg total) by mouth 2 (two) times a day for 7 days, Starting Wed 8/9/2023, Until Wed 8/16/2023, Normal      naproxen (Naprosyn) 500 mg tablet Take 1 tablet (500 mg total) by mouth 2 (two) times a day with meals for 7 days, Starting Wed 8/9/2023, Until Wed 8/16/2023, Normal         CONTINUE these medications which have NOT CHANGED    Details   amoxicillin-clavulanate (AUGMENTIN) 500-125 mg per tablet Take 1 tablet by mouth 2 (two) times a day, Historical Med      cefuroxime (CEFTIN) 250 mg tablet TAKE 1 TABLET BY MOUTH TWICE A DAY UNTIL DONE, Historical Med      Cholecalciferol (Vitamin D) 50 MCG (2000 UT) CAPS Take 2,000 Units by mouth, Historical Med      HYDROcodone-acetaminophen (NORCO) 5-325 mg per tablet For moderate to severe pain, take 1 tab prn q6 hours day 1; 1 tab prn q 12 hours days 2; 1 tab prn once day 3., Historical Med      Multiple Vitamin (multivitamin) tablet Take 1 tablet by mouth daily, Historical Med      Multiple Vitamins-Minerals (ONE DAILY MULTIVITAMIN WOMEN PO) Take by mouth, Historical Med      Omega-3 Fatty Acids (fish oil) 1,000 mg Take 2,000 mg by mouth daily, Historical Med      tuberculin 5 units/0.1 mL 0.1 mL, Historical Med      vitamin B-12 (VITAMIN B-12) 1,000 mcg tablet Take 2,000 mcg by mouth daily, Historical Med           No discharge procedures on file. PDMP Review     None           ED Provider  Attending physically available and evaluated Jordy PrakashRadha  I managed the patient along with the ED Attending.     Electronically Signed by         Rob Cadet DO  08/10/23 [id number]

## 2023-09-12 ENCOUNTER — ANNUAL EXAM (OUTPATIENT)
Dept: OBGYN CLINIC | Facility: CLINIC | Age: 43
End: 2023-09-12

## 2023-09-12 VITALS
BODY MASS INDEX: 31.06 KG/M2 | WEIGHT: 168.8 LBS | HEIGHT: 62 IN | HEART RATE: 84 BPM | DIASTOLIC BLOOD PRESSURE: 89 MMHG | RESPIRATION RATE: 18 BRPM | SYSTOLIC BLOOD PRESSURE: 125 MMHG

## 2023-09-12 DIAGNOSIS — Z01.419 WELL WOMAN EXAM WITH ROUTINE GYNECOLOGICAL EXAM: Primary | ICD-10-CM

## 2023-09-12 DIAGNOSIS — R10.2 PELVIC PAIN: ICD-10-CM

## 2023-09-12 PROCEDURE — G0145 SCR C/V CYTO,THINLAYER,RESCR: HCPCS | Performed by: OBSTETRICS & GYNECOLOGY

## 2023-09-12 PROCEDURE — 99396 PREV VISIT EST AGE 40-64: CPT | Performed by: OBSTETRICS & GYNECOLOGY

## 2023-09-12 PROCEDURE — G0476 HPV COMBO ASSAY CA SCREEN: HCPCS | Performed by: OBSTETRICS & GYNECOLOGY

## 2023-09-12 NOTE — ASSESSMENT & PLAN NOTE
-Cervical cancer screening: co-testing done today  -Declines STI testing. Safe sex practices advised  -Contraception: s/p tubal ligation  -Yearly mammogram advised - gets them ordered through her PCP and is already scheduled for October 2023  -Previously referred to GI by her PCP for bloody stools and constipation but never followed through. She was referred again today  -The following were addressed today:    a) Quitting once daily cigarette use   b) If intermenstrual bleeding recurs, she is to follow up for an EMB   c) Family hx of breast cancer: she is to confirm with her cousins who have breast cancer if they have had genetic testing.  If evidence of familial mutation, will refer her to genetic counseling for testing  -If no further intermenstrual bleeding and pelvic ultrasound is normal, she should return in 1 year for annual exam

## 2023-09-12 NOTE — PROGRESS NOTES
[facility] Annual Visit     Monica Graff is a 43 y.o. [id number] with normal gynecologic exam.  Problem List Items Addressed This Visit        Other    Well woman exam with routine gynecological exam - Primary     -Cervical cancer screening: co-testing done today  -Declines STI testing. Safe sex practices advised  -Contraception: s/p tubal ligation  -Yearly mammogram advised - gets them ordered through her PCP and is already scheduled for October 2023  -Previously referred to GI by her PCP for bloody stools and constipation but never followed through. She was referred again today  -The following were addressed today:    a) Quitting once daily cigarette use   b) If intermenstrual bleeding recurs, she is to follow up for an EMB   c) Family hx of breast cancer: she is to confirm with her cousins who have breast cancer if they have had genetic testing. If evidence of familial mutation, will refer her to genetic counseling for testing  -If no further intermenstrual bleeding and pelvic ultrasound is normal, she should return in 1 year for annual exam         Relevant Orders    Ambulatory Referral to Gastroenterology    Liquid-based pap, screening   Other Visit Diagnoses     Pelvic pain        Relevant Orders    US pelvis complete non OB          Discussed with Dr. Lesle Simmonds. Nina Corcoran MD  PGY-IV, OB/GYN    Subjective:   Monica Graff is a 43 y.o. [id number] who presents for annual well woman exam.   Complaints/concerns today: abdominal and pelvic pain    Review of Systems  No vaginal discharge, labial erythema or lesions, dyspareunia  Periods are regular, lasting 5days. Isolated episode on intermenstrual bleeding last month with associated abdominal, pelvic and back pain  Sexually active: with 1 male partner. Current contraception: tubal ligation  Denies urinary or fecal incontinence.  No bulge complaints   No breast tenderness, lumps or skin changes  Family history of breast, endometrial, uterine, ovarian or colon cancers: yes - 2 cousins with with breast cancer. Unknown if they have had genetic testing so she will clarify     Screenings & Health Maintenance:    Last cervical cancer screening unknown. History of abnormal cervical cancer screening: yes with Cryotherapy at age 16  Last mammogram Oct 2022. No history of abnormal mammograms. Next one scheduled for Oct 2023  No colonoscopies previously. Does have history of bloody stools with constipation  She is not getting much exercise  She feels safe at home  She does follows a balanced diet   She smokes one cigarette daily. No illicit drug use. Social alcohol use  Gardasil vaccine: does not recall      Depression Screening: Patient's depression screening was significant for a PHQ-2 score of 4. Their PHQ-9 score was 8. History reviewed. No pertinent past medical history. Past Surgical History:   Procedure Laterality Date   • BREAST CYST EXCISION Left 1998   • CERVIX LESION DESTRUCTION  1996   • TUBAL LIGATION  2012     Family History   Problem Relation Age of Onset   • Hypertension Mother    • Irregular heart beat Mother    • Heart disease Mother    • Liver disease Mother    • Hypertension Father    • Diabetes Father    • Hypothyroidism Sister    • Pancreatitis Sister    • Anxiety disorder Sister    • Depression Sister    • Depression Sister    • Anxiety disorder Sister    • No Known Problems Sister    • Heart disease Brother    • Drug abuse Brother    • No Known Problems Maternal Grandmother    • No Known Problems Maternal Grandfather    • No Known Problems Paternal Grandmother    • No Known Problems Paternal Grandfather    • Breast cancer Cousin    • Breast cancer Cousin        Objective:  /89 (BP Location: Right arm, Patient Position: Sitting, Cuff Size: Large)   Pulse 84   Resp 18   Ht 5' 2" (1.575 m)   Wt 76.6 kg (168 lb 12.8 oz)   LMP 08/26/2023 (Approximate)   BMI 30.87 kg/m²  Body mass index is 30.87 kg/m².      Physical Exam:  GEN: The patient was alert and oriented x3, pleasant well-appearing female in no acute distress. HEENT:  Unremarkable, no anterior or posterior lymphadenopathy, no thyromegaly  CV:  Regular rate and rhythm  RESP:  Unlabored breathing, CTA bilaterally  BREAST:  Symmetric breasts with no palpable breast masses or obvious breast lesions. She has no retractions or nipple discharge. She has no axillary abnormalities or palpable masses. GI:  Soft, nontender, non-distended  MSK: bilateral lower extremities are nontender, no edema  : Normal appearing external female genitalia, normal appearing urethral meatus. On sterile speculum exam, normal appearing vaginal epithelium, no prolapse, physiologic appearing discharge, no bleeding, grossly normal appearing cervix. On bimanual exam,  no cervical motion tenderness; uterus is smooth, mobile, and mildly tender and 8-9 weeks size. No tenderness or fullness in the bilateral adnexa.

## 2023-09-13 LAB
HPV HR 12 DNA CVX QL NAA+PROBE: NEGATIVE
HPV16 DNA CVX QL NAA+PROBE: NEGATIVE
HPV18 DNA CVX QL NAA+PROBE: NEGATIVE

## 2023-09-15 LAB
LAB AP GYN PRIMARY INTERPRETATION: NORMAL
Lab: NORMAL

## 2023-09-15 NOTE — RESULT ENCOUNTER NOTE
Normal cervical cancer screening results. SCHEDit message sent. Nina Gamble MD  PGY-IV, OB/GYN  9/15/2023, 4:37 PM

## 2023-11-11 PROBLEM — Z01.419 WELL WOMAN EXAM WITH ROUTINE GYNECOLOGICAL EXAM: Status: RESOLVED | Noted: 2023-09-12 | Resolved: 2023-11-11

## 2024-03-27 ENCOUNTER — TELEPHONE (OUTPATIENT)
Age: 44
End: 2024-03-27

## 2024-03-27 NOTE — TELEPHONE ENCOUNTER
New Patient    What is the reason for the patient’s appointment?:Patient called stating she was in the ER with flank pain.  Patient stated she thinks she passed a stone.  She was having blood in her urine with blood clots on Saturday.  She wants an appointment for today or tomorrow.  Patient had ct scan done and there is no evidence of kidney stones at this time.  She will call [facility] urology .  She stated they had appointment for tomorrow..    She will call back if she wants to follow up with [facility] urology.     What office location does the patient prefer?:    Does patient have Imaging/Lab Results:    Have patient records been requested?:  If No, are the records showing in Epic:       INSURANCE:   Do we accept the patient's insurance or is the patient Self-Pay?:    Insurance Provider:Personal choice  Plan Type/Number:   Member ID#:       HISTORY:   Has the patient had any previous Urologist(s)?:no     Was the patient seen in the ED?:03/26/24 CAROLYN Huffman .    Has the patient had any outside testing done?:ct scan     Does the patient have a personal history of cancer?:no

## 2024-05-20 ENCOUNTER — TELEPHONE (OUTPATIENT)
Age: 44
End: 2024-05-20

## 2024-05-20 NOTE — TELEPHONE ENCOUNTER
Date: 7/17/2024 Status: [facility]   Time: 7:40 AM Length: 40   Visit Type: NEW PATIENT PG [[id number]] Copay: $0.00   Provider: Gilberto Thorpe PA-C Department: [facility] [place]

## 2024-05-20 NOTE — TELEPHONE ENCOUNTER
Continue amlodipine 10 mg  Increase hydralazine 50 mg three times a day  Increase clonidine 0.3mg three times aday  Continue lisinopril/HCTZ twice a day     Pt has [facility] ID number [id number] and will bring insurance card to [facility].

## 2025-03-14 ENCOUNTER — OFFICE VISIT (OUTPATIENT)
Dept: INTERNAL MEDICINE CLINIC | Facility: OTHER | Age: 45
End: 2025-03-14
Payer: COMMERCIAL

## 2025-03-14 VITALS
HEIGHT: 62 IN | WEIGHT: 162 LBS | OXYGEN SATURATION: 98 % | BODY MASS INDEX: 29.81 KG/M2 | DIASTOLIC BLOOD PRESSURE: 78 MMHG | TEMPERATURE: 98.7 F | SYSTOLIC BLOOD PRESSURE: 110 MMHG | HEART RATE: 67 BPM

## 2025-03-14 DIAGNOSIS — R07.9 CHEST PAIN, UNSPECIFIED TYPE: Primary | ICD-10-CM

## 2025-03-14 PROCEDURE — 99204 OFFICE O/P NEW MOD 45 MIN: CPT | Performed by: NURSE PRACTITIONER

## 2025-03-14 PROCEDURE — 93000 ELECTROCARDIOGRAM COMPLETE: CPT | Performed by: NURSE PRACTITIONER

## 2025-03-14 RX ORDER — ASPIRIN 81 MG/1
324 TABLET, CHEWABLE ORAL ONCE
Status: COMPLETED | OUTPATIENT
Start: 2025-03-14 | End: 2025-03-14

## 2025-03-14 RX ADMIN — ASPIRIN 324 MG: 81 TABLET, CHEWABLE ORAL at 15:38

## 2025-03-14 NOTE — PROGRESS NOTES
Name: Donna Grubbs      : 1980      MRN: [id number]  Encounter Provider: GABE Roy  Encounter Date: 3/14/2025   Encounter department: [facility]  :  Assessment & Plan  Chest pain, unspecified type  Acute left sided chest pain that started this afternoon at work. Went to stand up and had sharp pain in her left chest with associated SOB and cold sweats. Chest pain persisted while patient was in office for at least 30 minutes with complaints of left arm numbness.  EKG normal sinus rhythm  Nonspecific ST abnormality   Patient given ASA 324mg  EMS called to transport pt to ED for ACS workup    Fam hx significant for premature CAD in her father, MI in early 40s  Personal hx of smoking approx 25 years, quit 2 weeks ago   Last LDL 97, BP controlled   Orders:    aspirin chewable tablet 324 mg    Transfer to other facility           History of Present Illness   Chest Pain   Associated symptoms include shortness of breath. Pertinent negatives include no cough, fever or palpitations.     Patient presents today with concern for acute left sided chest pain. Symptoms started 30 minutes ago at work, she went to stand up and had sudden chest pain on her left side. Coming and going. Lasts a few seconds then leaves and comes back. She feels some numbness in her left arm   She continues to have chest pain now during the visit. She states when she started with the chest pain she also had cold sweats, SOB and left arm numbness.     Last LDL 97  Quit smoking 2 weeks ago.   Smoked for 25 years.     Review of Systems   Constitutional:  Negative for chills and fever.   Respiratory:  Positive for shortness of breath. Negative for cough, chest tightness and wheezing.    Cardiovascular:  Positive for chest pain. Negative for palpitations and leg swelling.       Objective   /78 (BP Location: Right arm, Patient Position: Sitting, Cuff Size: Standard)   Pulse 67   Temp 98.7  "°F (37.1 °C) (Temporal)   Ht 5' 2\" (1.575 m)   Wt 73.5 kg (162 lb)   SpO2 98%   BMI 29.63 kg/m²      Physical Exam  Vitals reviewed.   Constitutional:       General: She is not in acute distress.     Appearance: She is well-developed. She is not diaphoretic.   HENT:      Head: Normocephalic and atraumatic.   Eyes:      Extraocular Movements: Extraocular movements intact.      Conjunctiva/sclera: Conjunctivae normal.      Pupils: Pupils are equal, round, and reactive to light.   Cardiovascular:      Rate and Rhythm: Normal rate and regular rhythm.      Heart sounds: Normal heart sounds. No murmur heard.  Pulmonary:      Effort: Pulmonary effort is normal. No respiratory distress.      Breath sounds: Normal breath sounds. No wheezing, rhonchi or rales.   Musculoskeletal:      Right lower leg: No edema.      Left lower leg: No edema.   Neurological:      Mental Status: She is alert and oriented to person, place, and time. Mental status is at baseline.   Psychiatric:         Mood and Affect: Mood normal.         Behavior: Behavior normal.         Thought Content: Thought content normal.         Judgment: Judgment normal.         "

## 2025-03-17 DIAGNOSIS — R07.9 CHEST PAIN, UNSPECIFIED TYPE: Primary | ICD-10-CM

## 2025-04-14 ENCOUNTER — OFFICE VISIT (OUTPATIENT)
Dept: INTERNAL MEDICINE CLINIC | Facility: OTHER | Age: 45
End: 2025-04-14
Payer: COMMERCIAL

## 2025-04-14 VITALS
TEMPERATURE: 97.4 F | SYSTOLIC BLOOD PRESSURE: 122 MMHG | WEIGHT: 164.8 LBS | BODY MASS INDEX: 30.33 KG/M2 | HEIGHT: 62 IN | HEART RATE: 93 BPM | OXYGEN SATURATION: 97 % | DIASTOLIC BLOOD PRESSURE: 84 MMHG

## 2025-04-14 DIAGNOSIS — Z12.31 ENCOUNTER FOR SCREENING MAMMOGRAM FOR BREAST CANCER: ICD-10-CM

## 2025-04-14 DIAGNOSIS — E55.9 VITAMIN D DEFICIENCY: ICD-10-CM

## 2025-04-14 DIAGNOSIS — F41.1 ANXIETY STATE: ICD-10-CM

## 2025-04-14 DIAGNOSIS — Z12.11 SCREENING FOR COLORECTAL CANCER: ICD-10-CM

## 2025-04-14 DIAGNOSIS — Z13.220 SCREENING FOR LIPID DISORDERS: ICD-10-CM

## 2025-04-14 DIAGNOSIS — Z11.59 NEED FOR HEPATITIS C SCREENING TEST: ICD-10-CM

## 2025-04-14 DIAGNOSIS — F43.10 PTSD (POST-TRAUMATIC STRESS DISORDER): ICD-10-CM

## 2025-04-14 DIAGNOSIS — Z72.0 TOBACCO ABUSE: ICD-10-CM

## 2025-04-14 DIAGNOSIS — Z13.228 SCREENING FOR METABOLIC DISORDER: ICD-10-CM

## 2025-04-14 DIAGNOSIS — F32.A DEPRESSION, UNSPECIFIED DEPRESSION TYPE: ICD-10-CM

## 2025-04-14 DIAGNOSIS — Z59.86 FINANCIAL INSECURITY: ICD-10-CM

## 2025-04-14 DIAGNOSIS — Z12.12 SCREENING FOR COLORECTAL CANCER: ICD-10-CM

## 2025-04-14 DIAGNOSIS — Z76.89 ENCOUNTER TO ESTABLISH CARE: ICD-10-CM

## 2025-04-14 DIAGNOSIS — K64.4 EXTERNAL HEMORRHOID: ICD-10-CM

## 2025-04-14 DIAGNOSIS — R07.9 CHEST PAIN, UNSPECIFIED TYPE: ICD-10-CM

## 2025-04-14 DIAGNOSIS — Z11.4 SCREENING FOR HIV (HUMAN IMMUNODEFICIENCY VIRUS): ICD-10-CM

## 2025-04-14 DIAGNOSIS — N87.0 CIN I (CERVICAL INTRAEPITHELIAL NEOPLASIA I): Primary | ICD-10-CM

## 2025-04-14 DIAGNOSIS — J45.20 MILD INTERMITTENT ASTHMA WITHOUT COMPLICATION: ICD-10-CM

## 2025-04-14 PROBLEM — F16.20: Status: RESOLVED | Noted: 2018-02-12 | Resolved: 2025-04-14

## 2025-04-14 PROBLEM — N32.81 OAB (OVERACTIVE BLADDER): Status: ACTIVE | Noted: 2024-06-26

## 2025-04-14 PROBLEM — N32.81 OAB (OVERACTIVE BLADDER): Status: RESOLVED | Noted: 2024-06-26 | Resolved: 2025-04-14

## 2025-04-14 PROBLEM — R31.9 HEMATURIA: Status: ACTIVE | Noted: 2024-04-03

## 2025-04-14 PROBLEM — R31.9 HEMATURIA: Status: RESOLVED | Noted: 2024-04-03 | Resolved: 2025-04-14

## 2025-04-14 PROCEDURE — 99214 OFFICE O/P EST MOD 30 MIN: CPT

## 2025-04-14 RX ORDER — HYDROXYZINE HYDROCHLORIDE 25 MG/1
25 TABLET, FILM COATED ORAL EVERY 6 HOURS PRN
Qty: 30 TABLET | Refills: 0 | Status: SHIPPED | OUTPATIENT
Start: 2025-04-14

## 2025-04-14 RX ORDER — HYDROCORTISONE 25 MG/G
CREAM TOPICAL 2 TIMES DAILY
Qty: 28 G | Refills: 0 | Status: SHIPPED | OUTPATIENT
Start: 2025-04-14

## 2025-04-14 RX ORDER — SERTRALINE HYDROCHLORIDE 25 MG/1
TABLET, FILM COATED ORAL
Qty: 46 TABLET | Refills: 0 | Status: SHIPPED | OUTPATIENT
Start: 2025-04-14 | End: 2025-05-14

## 2025-04-14 SDOH — ECONOMIC STABILITY - INCOME SECURITY: FINANCIAL INSECURITY: Z59.86

## 2025-04-14 NOTE — ASSESSMENT & PLAN NOTE
Patient seen on 3/14/2025 for chest pain  EKG normal sinus rhythm, nonspecific ST changes  Patient was referred to ED at which time troponins were negative  She does have upcoming follow-up with cardiology on 5/1/2025 given her significant family history for premature CAD  Patient reports all chest pain has resolved at this time

## 2025-04-14 NOTE — ASSESSMENT & PLAN NOTE
See plan under anxiety    Orders:    Ambulatory referral to Psych Services; Future    sertraline (ZOLOFT) 25 mg tablet; Take 1 tablet (25 mg total) by mouth daily for 14 days, THEN 2 tablets (50 mg total) daily for 16 days.

## 2025-04-14 NOTE — ASSESSMENT & PLAN NOTE
Patient with a history of anxiety, depression, PTSD  She notes she has been managing anxiety and depression without medication at this time but notes worsening of symptoms over the last several months  She was previously involved in her Hinduism, but states that she has been absent from Hinduism and would like to reestablish  Patient does follow with NA meetings due to her history of PCP use, she has been sober for 1 year  Discussed medication options for patient including SSRIs/SNRIs, Wellbutrin, BuSpar, hydroxyzine including side effects of medication  Patient elects to start Zoloft.  Will start 25 mg daily x 2 weeks, then increase to 50 mg daily if tolerating well  Will order hydroxyzine 25 mg every 6 hours as needed for anxiety.  Advised may cause drowsiness  Will refer to talk therapy per patient request  Patient denies SI/HI  Follow-up 6 to 8 weeks or sooner symptoms worsen    Orders:    Ambulatory referral to Psych Services; Future    sertraline (ZOLOFT) 25 mg tablet; Take 1 tablet (25 mg total) by mouth daily for 14 days, THEN 2 tablets (50 mg total) daily for 16 days.    hydrOXYzine HCL (ATARAX) 25 mg tablet; Take 1 tablet (25 mg total) by mouth every 6 (six) hours as needed for itching

## 2025-04-14 NOTE — ASSESSMENT & PLAN NOTE
Nonthrombosed external hemorrhoid noted on exam  Will treat with topical Anusol cream twice daily x 1 week  Follow-up in office if no improvement  Patient requesting referral to GI for colonoscopy for colon cancer screening.  Advised that this may not be covered until age 45, but she is able to schedule for that time for screening colonoscopy  Orders:    hydrocortisone (ANUSOL-HC) 2.5 % rectal cream; Apply topically 2 (two) times a day

## 2025-04-14 NOTE — PROGRESS NOTES
Name: Donna Grubbs      : 1980      MRN: [id number]  Encounter Provider: Angella Ryan PA-C  Encounter Date: 2025   Encounter department: [facility]  :  Assessment & Plan  Encounter to establish care  Past medical history, meds, allergies, surgical history, family history reviewed with patient         Anxiety state  Patient with a history of anxiety, depression, PTSD  She notes she has been managing anxiety and depression without medication at this time but notes worsening of symptoms over the last several months  She was previously involved in her Tenriism, but states that she has been absent from Tenriism and would like to reestablish  Patient does follow with NA meetings due to her history of PCP use, she has been sober for 1 year  Discussed medication options for patient including SSRIs/SNRIs, Wellbutrin, BuSpar, hydroxyzine including side effects of medication  Patient elects to start Zoloft.  Will start 25 mg daily x 2 weeks, then increase to 50 mg daily if tolerating well  Will order hydroxyzine 25 mg every 6 hours as needed for anxiety.  Advised may cause drowsiness  Will refer to talk therapy per patient request  Patient denies SI/HI  Follow-up 6 to 8 weeks or sooner symptoms worsen    Orders:    Ambulatory referral to Psych Services; Future    sertraline (ZOLOFT) 25 mg tablet; Take 1 tablet (25 mg total) by mouth daily for 14 days, THEN 2 tablets (50 mg total) daily for 16 days.    hydrOXYzine HCL (ATARAX) 25 mg tablet; Take 1 tablet (25 mg total) by mouth every 6 (six) hours as needed for itching    PTSD (post-traumatic stress disorder)  See plan under anxiety    Orders:    Ambulatory referral to Psych Services; Future    sertraline (ZOLOFT) 25 mg tablet; Take 1 tablet (25 mg total) by mouth daily for 14 days, THEN 2 tablets (50 mg total) daily for 16 days.    Depression, unspecified depression type  See plan under anxiety    Orders:    Ambulatory  referral to Psych Services; Future    sertraline (ZOLOFT) 25 mg tablet; Take 1 tablet (25 mg total) by mouth daily for 14 days, THEN 2 tablets (50 mg total) daily for 16 days.    Screening for colorectal cancer    Orders:    Ambulatory Referral to Gastroenterology; Future    Need for hepatitis C screening test    Orders:    Hepatitis C Antibody; Future    Screening for HIV (human immunodeficiency virus)    Orders:    HIV 1/2 AG/AB w Reflex [facility] for 2 yr old and above; Future    Vitamin D deficiency  Posterior vitamin D deficiency, currently on OTC supplementation  Will update labs         Screening for lipid disorders    Orders:    Lipid Panel with Direct LDL reflex; Future    Screening for metabolic disorder    Orders:    Comprehensive metabolic panel; Future    TSH, 3rd generation with Free T4 reflex; Future    CBC and differential; Future    Mild intermittent asthma without complication  Stable without recent exacerbation.  No current inhaler use         Encounter for screening mammogram for breast cancer    Orders:    Mammo screening bilateral w 3d and cad; Future    External hemorrhoid  Nonthrombosed external hemorrhoid noted on exam  Will treat with topical Anusol cream twice daily x 1 week  Follow-up in office if no improvement  Patient requesting referral to GI for colonoscopy for colon cancer screening.  Advised that this may not be covered until age 45, but she is able to schedule for that time for screening colonoscopy  Orders:    hydrocortisone (ANUSOL-HC) 2.5 % rectal cream; Apply topically 2 (two) times a day    Financial insecurity  Patient reports financial insecurity.  She has a single mother, supporting 2 children on single income  Discussed case management referral which patient accepts today  Orders:    Ambulatory Referral to Social Work Care Management Program; Future    HONEY I (cervical intraepithelial neoplasia I)  Follows with OB/GYN         Tobacco abuse  Tobacco free for 1 month.   Congratulated on progress and encouraged continued cessation  Had a history of smoking 1 to 2 cigarettes daily         Chest pain, unspecified type  Patient seen on 3/14/2025 for chest pain  EKG normal sinus rhythm, nonspecific ST changes  Patient was referred to ED at which time troponins were negative  She does have upcoming follow-up with cardiology on 5/1/2025 given her significant family history for premature CAD  Patient reports all chest pain has resolved at this time              History of Present Illness   Patient is a 44-year-old female presenting to the office to establish care    Patient is endorsing burning sensation in her buttocks x 2 days  Patient reports a burning sensation around the rectum  Denies straining with bowel movements, constipation, diarrhea, nausea, vomiting, abdominal pain.  Patient does admit to sitting often on the toilet bowl for prolonged periods of time  Denies blood in the stool  Patient does have a history of external hemorrhoids noted on most recent OB/GYN exam in October 2020 for      Of note, patient was seen in the ED on 3/14/2024 for chest pain  EKG: Normal sinus rhythm, nonspecific ST abnormality  Patient was given  mg in office, then referred to ED  Upon arrival to ED, chest pain resolved  Troponins WNL  Patient was discharged with follow-up to cardiology  Fam hx significant for premature CAD in her father, MI in early 40s  Last LDL 97, BP controlled     Patient does not have a history of PTSD, anxiety, depression noted in her chart  Patient has been reporting increased depression, sadness, crying.  She notes stressors include supporting her 2 children, single income.  She has 3 children total, ages 25, 21, 13  Increased sadness, crying  Sleep: 6-8 hours, interrupted- feels it is due to anxiety  Denies SI/HI  She states that she was on medication previously for mental health while she lived in [place], but has not been on medications for at least 5 or 6 years  "  She reports her family all lives in [place], so she does not have support locally.  She did find support through her debbie, but states that she has not been to Orthodoxy recently      Drugs: hx of PCP use, clean x 1 year, relapsed 1 year ago. Follows with NA meetings  Tobacco: quit 1 month ago, previously 1-2 cigareetes per day  Alcohol: occasional, denies binge drinking          Review of Systems   Constitutional:  Negative for chills, fatigue and fever.   HENT:  Negative for congestion, ear pain, postnasal drip, rhinorrhea, sinus pressure, sore throat and trouble swallowing.    Eyes:  Negative for pain and visual disturbance.   Respiratory:  Negative for cough, shortness of breath and wheezing.    Cardiovascular:  Negative for chest pain, palpitations and leg swelling.   Gastrointestinal:  Negative for abdominal pain, blood in stool, constipation, diarrhea, nausea and vomiting.   Genitourinary:  Negative for difficulty urinating, dysuria and hematuria.   Musculoskeletal:  Negative for arthralgias and back pain.   Neurological:  Positive for headaches. Negative for dizziness, weakness, light-headedness and numbness.   Psychiatric/Behavioral:  Positive for dysphoric mood and sleep disturbance. Negative for hallucinations, self-injury and suicidal ideas. The patient is nervous/anxious.    All other systems reviewed and are negative.      Objective   /84 (BP Location: Left arm, Patient Position: Sitting, Cuff Size: Standard)   Pulse 93   Temp (!) 97.4 °F (36.3 °C) (Temporal)   Ht 5' 2\" (1.575 m)   Wt 74.8 kg (164 lb 12.8 oz)   SpO2 97%   BMI 30.14 kg/m²      Physical Exam  Vitals and nursing note reviewed. Exam conducted with a chaperone present (GA Moy).   Constitutional:       General: She is not in acute distress.     Appearance: Normal appearance. She is not ill-appearing.   HENT:      Head: Normocephalic and atraumatic.      Right Ear: External ear normal.      Left Ear: External ear normal.    "   Nose: Nose normal.      Mouth/Throat:      Mouth: Mucous membranes are moist.      Pharynx: Oropharynx is clear.   Eyes:      General: No scleral icterus.     Conjunctiva/sclera: Conjunctivae normal.      Pupils: Pupils are equal, round, and reactive to light.   Cardiovascular:      Rate and Rhythm: Normal rate and regular rhythm.      Heart sounds: Normal heart sounds. No murmur heard.  Pulmonary:      Effort: Pulmonary effort is normal. No respiratory distress.      Breath sounds: Normal breath sounds. No wheezing, rhonchi or rales.   Chest:      Chest wall: No tenderness.   Abdominal:      General: Abdomen is flat. Bowel sounds are normal.      Palpations: Abdomen is soft.      Tenderness: There is abdominal tenderness (minimal pressure feeling reported) in the epigastric area and suprapubic area.   Genitourinary:     Rectum: External hemorrhoid present. No anal fissure.   Musculoskeletal:      Right lower leg: No edema.      Left lower leg: No edema.   Skin:     General: Skin is warm and dry.      Coloration: Skin is not pale.      Findings: No erythema, lesion or rash.   Neurological:      General: No focal deficit present.      Mental Status: She is alert and oriented to person, place, and time. Mental status is at baseline.      Motor: No weakness.      Coordination: Coordination normal.   Psychiatric:         Attention and Perception: Attention normal.         Mood and Affect: Mood normal. Affect is tearful.         Speech: Speech normal.         Behavior: Behavior normal. Behavior is cooperative.         Thought Content: Thought content does not include homicidal or suicidal ideation.           Disclaimer: This note was generated with voice recognition software.  Phonetic, grammatical, and spelling errors may be present as a result.  Please contact provider with any concerns or questions

## 2025-04-14 NOTE — ASSESSMENT & PLAN NOTE
Tobacco free for 1 month.  Congratulated on progress and encouraged continued cessation  Had a history of smoking 1 to 2 cigarettes daily

## 2025-04-15 ENCOUNTER — PATIENT OUTREACH (OUTPATIENT)
Dept: CASE MANAGEMENT | Facility: OTHER | Age: 45
End: 2025-04-15

## 2025-04-15 ENCOUNTER — TELEPHONE (OUTPATIENT)
Dept: ADMINISTRATIVE | Facility: OTHER | Age: 45
End: 2025-04-15

## 2025-04-15 NOTE — PROGRESS NOTES
NICHOL KENNEDY received referral to contact patient to offer support regarding financial insecurity.  Chart reviewed and call placed to patient however no answer and no option to leave a message.  Will continue attempts to contact patient to offer support.

## 2025-04-15 NOTE — TELEPHONE ENCOUNTER
Upon review of the In Basket request we have noted this is a NON-VALUE BASED item. We are unable to complete this request. Our team has the capability to assist in retrieval, updating, and linking of the following items: Chlamydia, CRC Cologuard, CRC Colonoscopy, CRC CT Colonography, CRC FIT/FOBT(3), CRC Sigmoidoscopy, CT Lung Screening, DEXA Scan, Diabetic Eye Exam, Diabetic Foot Exam, Hemoglobin A1c, Hemoglobin (pediatrics), Hepatitis C, HIV (cannot retrieve), Immunizations, Lead, Mammogram, Medicare AWV, Pap Smear (HPV), and Urine Microalbumin.    Any additional questions or concerns should be emailed to the Practice Liaisons via the appropriate education email address, please do not reply via In Basket.    Thank you  Jessika Rich [facility] VALUE BASED VIR

## 2025-04-15 NOTE — TELEPHONE ENCOUNTER
----- Message from Angella Ryan PA-C sent at 4/14/2025  1:39 PM EDT -----  04/14/25 1:40 PM    Hello, our patient Donna Grubbs has had annual physical completed/performed. Please assist in updating the patient chart by pulling the Care Everywhere (CE) document. The date of service is 12/19/24.     Thank you,  Angella Ryan PA-C  [facility] [place]

## 2025-04-17 ENCOUNTER — PATIENT OUTREACH (OUTPATIENT)
Dept: CASE MANAGEMENT | Facility: OTHER | Age: 45
End: 2025-04-17

## 2025-04-17 NOTE — PROGRESS NOTES
NICHOL KENNEDY completed chart review and placed second call to patient today to offer support regarding financial insecurity however no answer and no option to leave a message as voicemail is full.  Unable to Reach letter has been sent to patient via My Chart.  Will close referral and remain available to provide support.

## 2025-04-17 NOTE — LETTER
[address] [facility] [address]  [phone]    Re: Care Management   4/17/2025       Dear Donna,    I was asked by your Medical Provider to contact you to offer you Psychosocial support.  I have tried to contact you on two occasions however I have been unable to reach you.  Please feel free to contact me at #[phone]. Thank you.         Sincerely,         KAHLIL Castillo

## 2025-04-29 ENCOUNTER — TELEPHONE (OUTPATIENT)
Age: 45
End: 2025-04-29

## 2025-04-29 NOTE — TELEPHONE ENCOUNTER
Per chart review, patient was sent a letter and LVM to confirm needs of service. Referral closed/unable to contact patient.

## 2025-05-02 ENCOUNTER — TELEPHONE (OUTPATIENT)
Dept: CARDIOLOGY CLINIC | Facility: CLINIC | Age: 45
End: 2025-05-02

## 2025-05-23 ENCOUNTER — RA CDI HCC (OUTPATIENT)
Dept: OTHER | Facility: HOSPITAL | Age: 45
End: 2025-05-23

## 2025-05-30 ENCOUNTER — TELEPHONE (OUTPATIENT)
Dept: INTERNAL MEDICINE CLINIC | Facility: OTHER | Age: 45
End: 2025-05-30

## 2025-05-30 DIAGNOSIS — E53.8 VITAMIN B12 DEFICIENCY: ICD-10-CM

## 2025-05-30 DIAGNOSIS — E54 VITAMIN C DEFICIENCY: Primary | ICD-10-CM

## 2025-05-30 NOTE — TELEPHONE ENCOUNTER
Patient has an appointment 6/2 and she was going to have her lab work completed this weekend and she is asking if an order can be placed to check her vitamin b12 and C as she has a history of them being low

## 2025-06-02 ENCOUNTER — OFFICE VISIT (OUTPATIENT)
Dept: INTERNAL MEDICINE CLINIC | Facility: OTHER | Age: 45
End: 2025-06-02
Payer: COMMERCIAL

## 2025-06-02 VITALS
TEMPERATURE: 97.7 F | HEIGHT: 62 IN | DIASTOLIC BLOOD PRESSURE: 74 MMHG | SYSTOLIC BLOOD PRESSURE: 104 MMHG | WEIGHT: 166 LBS | OXYGEN SATURATION: 97 % | BODY MASS INDEX: 30.55 KG/M2 | HEART RATE: 58 BPM

## 2025-06-02 DIAGNOSIS — Z72.0 TOBACCO ABUSE: ICD-10-CM

## 2025-06-02 DIAGNOSIS — M54.50 CHRONIC MIDLINE LOW BACK PAIN WITHOUT SCIATICA: ICD-10-CM

## 2025-06-02 DIAGNOSIS — F43.10 PTSD (POST-TRAUMATIC STRESS DISORDER): ICD-10-CM

## 2025-06-02 DIAGNOSIS — F16.921: ICD-10-CM

## 2025-06-02 DIAGNOSIS — F41.1 ANXIETY STATE: ICD-10-CM

## 2025-06-02 DIAGNOSIS — E55.9 VITAMIN D DEFICIENCY: ICD-10-CM

## 2025-06-02 DIAGNOSIS — F33.1 MODERATE EPISODE OF RECURRENT MAJOR DEPRESSIVE DISORDER (HCC): Primary | ICD-10-CM

## 2025-06-02 DIAGNOSIS — G89.29 CHRONIC MIDLINE LOW BACK PAIN WITHOUT SCIATICA: ICD-10-CM

## 2025-06-02 LAB
ALBUMIN SERPL-MCNC: 4.2 G/DL (ref 3.5–5.7)
ALP SERPL-CCNC: 68 U/L (ref 35–120)
ALT SERPL-CCNC: 29 U/L
ANION GAP SERPL CALCULATED.3IONS-SCNC: 7 MMOL/L (ref 3–11)
AST SERPL-CCNC: 21 U/L
BASOPHILS # BLD AUTO: 0 THOU/CMM (ref 0–0.1)
BASOPHILS NFR BLD AUTO: 1 %
BILIRUB SERPL-MCNC: 0.6 MG/DL (ref 0.2–1)
BUN SERPL-MCNC: 12 MG/DL (ref 7–25)
CALCIUM SERPL-MCNC: 8.9 MG/DL (ref 8.5–10.5)
CHLORIDE SERPL-SCNC: 107 MMOL/L (ref 100–109)
CHOLEST SERPL-MCNC: 173 MG/DL
CHOLEST/HDLC SERPL: 3.4 {RATIO}
CO2 SERPL-SCNC: 27 MMOL/L (ref 21–31)
CREAT SERPL-MCNC: 0.66 MG/DL (ref 0.4–1.1)
CYTOLOGY CMNT CVX/VAG CYTO-IMP: NORMAL
DIFFERENTIAL METHOD BLD: NORMAL
EOSINOPHIL # BLD AUTO: 0.1 THOU/CMM (ref 0–0.5)
EOSINOPHIL NFR BLD AUTO: 1 %
ERYTHROCYTE [DISTWIDTH] IN BLOOD BY AUTOMATED COUNT: 13 % (ref 12–16)
GFR/BSA.PRED SERPLBLD CYS-BASED-ARV: 110 ML/MIN/{1.73_M2}
GLUCOSE SERPL-MCNC: 86 MG/DL (ref 65–99)
HCT VFR BLD AUTO: 39.1 % (ref 35–43)
HCV AB SERPL QL IA: NONREACTIVE
HDLC SERPL-MCNC: 51 MG/DL (ref 23–92)
HGB BLD-MCNC: 13.2 G/DL (ref 11.5–14.5)
HIV 1+2 AB+HIV1 P24 AG SERPL QL IA: NONREACTIVE
LDLC SERPL CALC-MCNC: 101 MG/DL
LYMPHOCYTES # BLD AUTO: 1.7 THOU/CMM (ref 1–3)
LYMPHOCYTES NFR BLD AUTO: 31 %
MCH RBC QN AUTO: 30.7 PG (ref 26–34)
MCHC RBC AUTO-ENTMCNC: 33.8 G/DL (ref 32–37)
MCV RBC AUTO: 91 FL (ref 80–100)
MONOCYTES # BLD AUTO: 0.6 THOU/CMM (ref 0.3–1)
MONOCYTES NFR BLD AUTO: 11 %
NEUTROPHILS # BLD AUTO: 3 THOU/CMM (ref 1.8–7.8)
NEUTROPHILS NFR BLD AUTO: 56 %
NONHDLC SERPL-MCNC: 122 MG/DL
PLATELET # BLD AUTO: 283 THOU/CMM (ref 140–350)
PMV BLD REES-ECKER: 7.8 FL (ref 7.5–11.3)
POTASSIUM SERPL-SCNC: 4.1 MMOL/L (ref 3.5–5.2)
PROT SERPL-MCNC: 6.6 G/DL (ref 6.3–8.3)
RBC # BLD AUTO: 4.29 MILL/CMM (ref 3.7–4.7)
SODIUM SERPL-SCNC: 141 MMOL/L (ref 135–145)
TRIGL SERPL-MCNC: 106 MG/DL
TSH SERPL-ACNC: 3.94 UIU/ML (ref 0.45–5.33)
VIT B12 SERPL-MCNC: 398 PG/ML (ref 180–914)
WBC # BLD AUTO: 5.4 THOU/CMM (ref 4–10)

## 2025-06-02 PROCEDURE — 99214 OFFICE O/P EST MOD 30 MIN: CPT

## 2025-06-02 RX ORDER — HYDROXYZINE HYDROCHLORIDE 25 MG/1
25 TABLET, FILM COATED ORAL EVERY 6 HOURS PRN
Qty: 30 TABLET | Refills: 0 | Status: CANCELLED | OUTPATIENT
Start: 2025-06-02

## 2025-06-02 RX ORDER — HYDROXYZINE HYDROCHLORIDE 25 MG/1
25 TABLET, FILM COATED ORAL EVERY 6 HOURS PRN
Qty: 30 TABLET | Refills: 0 | Status: SHIPPED | OUTPATIENT
Start: 2025-06-02

## 2025-06-02 RX ORDER — SERTRALINE HYDROCHLORIDE 25 MG/1
25 TABLET, FILM COATED ORAL DAILY
Qty: 90 TABLET | Refills: 1 | Status: SHIPPED | OUTPATIENT
Start: 2025-06-02

## 2025-06-02 NOTE — ASSESSMENT & PLAN NOTE
See plan under depression  Orders:    hydrOXYzine HCL (ATARAX) 25 mg tablet; Take 1 tablet (25 mg total) by mouth every 6 (six) hours as needed for anxiety    sertraline (ZOLOFT) 25 mg tablet; Take 1 tablet (25 mg total) by mouth daily

## 2025-06-02 NOTE — ASSESSMENT & PLAN NOTE
See plan under depression  Orders:    Ambulatory referral to Addiction Services; Future    sertraline (ZOLOFT) 25 mg tablet; Take 1 tablet (25 mg total) by mouth daily

## 2025-06-02 NOTE — ASSESSMENT & PLAN NOTE
Patient feels symptom improvement with Zoloft 25 mg daily.  She did try increasing to 50 mg daily, but stated that her symptoms worsened  Will continue with Zoloft 25 mg daily, hydroxyzine 25 mg every 6 hours as needed for anxiety  Also encourage patient to start melatonin supplement nightly  Will refer to addiction services, share program which will include psychiatry services  Denies SI/HI  Follow-up 6-8 weeks    Orders:    Ambulatory referral to Addiction Services; Future    sertraline (ZOLOFT) 25 mg tablet; Take 1 tablet (25 mg total) by mouth daily

## 2025-06-02 NOTE — ASSESSMENT & PLAN NOTE
Patient with a history of substance abuse, recent PCP intoxication on 5/24/2025.  Prior to this, was clean from substance abuse x 1 year  Will refer to addiction services, patient did restart going to  meetings  Patient's triggers include going to [employer].  Advised her to continue to avoid going to [employer] as much as possible  Orders:    Ambulatory referral to Addiction Services; Future

## 2025-06-02 NOTE — PROGRESS NOTES
Name: Donna Grubbs      : 1980      MRN: [id number]  Encounter Provider: Angella Ryan PA-C  Encounter Date: 2025   Encounter department: [facility]  :  Assessment & Plan  Moderate episode of recurrent major depressive disorder (HCC)  Patient feels symptom improvement with Zoloft 25 mg daily.  She did try increasing to 50 mg daily, but stated that her symptoms worsened  Will continue with Zoloft 25 mg daily, hydroxyzine 25 mg every 6 hours as needed for anxiety  Also encourage patient to start melatonin supplement nightly  Will refer to addiction services, [facility] program which will include psychiatry services  Denies SI/HI  Follow-up 6-8 weeks    Orders:    Ambulatory referral to Addiction Services; Future    sertraline (ZOLOFT) 25 mg tablet; Take 1 tablet (25 mg total) by mouth daily    Anxiety state  See plan under depression  Orders:    hydrOXYzine HCL (ATARAX) 25 mg tablet; Take 1 tablet (25 mg total) by mouth every 6 (six) hours as needed for anxiety    sertraline (ZOLOFT) 25 mg tablet; Take 1 tablet (25 mg total) by mouth daily    PCP intoxication, with delirium (HCC)  Patient with a history of substance abuse, recent PCP intoxication on 2025.  Prior to this, was clean from substance abuse x 1 year  Will refer to addiction services, patient did restart going to  meetings  Patient's triggers include going to [place].  Advised her to continue to avoid going to [place] as much as possible  Orders:    Ambulatory referral to Addiction Services; Future    PTSD (post-traumatic stress disorder)  See plan under depression  Orders:    Ambulatory referral to Addiction Services; Future    sertraline (ZOLOFT) 25 mg tablet; Take 1 tablet (25 mg total) by mouth daily    Chronic midline low back pain without sciatica  Follows with [facility]  physiatry, patient reports increase in pain, interested in repeat spinal injection       Tobacco  abuse  Tobacco free for 2 months.  Congratulated on progress and encouraged continued cessation  Had a history of smoking 1 to 2 cigarettes daily           Vitamin D deficiency  Continue OTC supplementation  Repeat lab in progress                History of Present Illness   Patient is a 44-year-old female presenting to the office for 6-8 week follow-up as well as ED follow-up  Patient was seen at [facility] on 5/24/2025 for PCP intoxication with delirium  Per EMS, patient got out of her car and started disrobing in the street  Patient does have a history of drug use, was clean for 1 year prior to this  Patient reports that she had a fight with her mother, which prompted her to leave the house to get high    Patient did also have recent labs completed  CMP WNL  Lipid panel: Total cholesterol 173, triglycerides 106, HDL 51,   TSH WNL  Vitamin B12 398  HIV, Hepatitis C negative    Drugs: hx of PCP use, relapsed 5/24/2025, clean for 1 year prior to this.  Recently restarted going to NA meetings  Tobacco: quit 2 months ago, previously 1-2 cigaretes per day  Alcohol: occasional, denies binge drinking            Review of Systems   Constitutional:  Negative for chills, fatigue and fever.   HENT:  Negative for congestion, ear pain, postnasal drip, rhinorrhea, sinus pressure, sore throat and trouble swallowing.    Eyes:  Negative for pain and visual disturbance.   Respiratory:  Negative for cough, shortness of breath and wheezing.    Cardiovascular:  Negative for chest pain, palpitations and leg swelling.   Gastrointestinal:  Negative for abdominal pain, constipation, diarrhea, nausea and vomiting.   Genitourinary:  Negative for difficulty urinating, dysuria, frequency, hematuria and urgency.   Neurological:  Negative for dizziness, weakness, light-headedness, numbness and headaches.   Psychiatric/Behavioral:  Positive for sleep disturbance. Negative for dysphoric mood, self-injury and suicidal ideas. The patient  "is not nervous/anxious.    All other systems reviewed and are negative.      Objective   /74 (BP Location: Left arm, Patient Position: Sitting, Cuff Size: Adult)   Pulse 58   Temp 97.7 °F (36.5 °C) (Temporal)   Ht 5' 2\" (1.575 m)   Wt 75.3 kg (166 lb)   SpO2 97%   BMI 30.36 kg/m²      Physical Exam  Vitals and nursing note reviewed.   Constitutional:       General: She is not in acute distress.     Appearance: Normal appearance. She is not ill-appearing.   HENT:      Head: Normocephalic and atraumatic.      Right Ear: External ear normal.      Left Ear: External ear normal.      Nose: Nose normal.      Mouth/Throat:      Mouth: Mucous membranes are moist.      Pharynx: Oropharynx is clear.     Eyes:      General: No scleral icterus.     Conjunctiva/sclera: Conjunctivae normal.      Pupils: Pupils are equal, round, and reactive to light.       Cardiovascular:      Rate and Rhythm: Normal rate and regular rhythm.      Heart sounds: Normal heart sounds. No murmur heard.     No friction rub. No gallop.   Pulmonary:      Effort: Pulmonary effort is normal. No respiratory distress.      Breath sounds: Normal breath sounds. No wheezing, rhonchi or rales.   Chest:      Chest wall: No tenderness.     Musculoskeletal:      Right lower leg: No edema.      Left lower leg: No edema.     Skin:     General: Skin is warm and dry.      Coloration: Skin is not pale.      Findings: No erythema, lesion or rash.     Neurological:      General: No focal deficit present.      Mental Status: She is alert and oriented to person, place, and time. Mental status is at baseline.     Psychiatric:         Attention and Perception: Attention normal.         Mood and Affect: Mood normal.         Speech: Speech normal.         Behavior: Behavior normal. Behavior is cooperative.         Thought Content: Thought content normal. Thought content does not include homicidal or suicidal ideation.           Disclaimer: This note was generated " with voice recognition software.  Phonetic, grammatical, and spelling errors may be present as a result.  Please contact provider with any concerns or questions

## 2025-06-02 NOTE — ASSESSMENT & PLAN NOTE
Follows with [facility]  physiatry, patient reports increase in pain, interested in repeat spinal injection

## 2025-06-02 NOTE — ASSESSMENT & PLAN NOTE
Tobacco free for 2 months.  Congratulated on progress and encouraged continued cessation  Had a history of smoking 1 to 2 cigarettes daily

## 2025-06-05 LAB — VIT C SERPL-MCNC: 0.9 MG/DL

## 2025-06-09 ENCOUNTER — RESULTS FOLLOW-UP (OUTPATIENT)
Dept: INTERNAL MEDICINE CLINIC | Facility: OTHER | Age: 45
End: 2025-06-09

## 2025-06-16 NOTE — TELEPHONE ENCOUNTER
----- Message from Angella Ryan PA-C sent at 6/14/2025  1:10 PM EDT -----  Please call patient regarding unreviewed Deven message.  All labs within normal range  ----- Message -----  From: Deven, Generic  Sent: 6/14/2025   5:00 AM EDT  To: Angella Ryan PA-C  Subject: Notification of Unviewed Test Results            ELBERT JOHNSON has not viewed the following results:  - COMPREHENSIVE METABOLIC PANEL  - LIPID PANEL WITH DIRECT LDL REFLEX  - TSH, 3RD GENERATION WITH FREE T4 REFLEX  - VITAMIN B12  - HIV 1/2 W/ REFLEX(MULTISPOT)  - HEPATITIS C ANTIBODY  - CBC AND DIFFERENTIAL  - VITAMIN C

## 2025-06-24 ENCOUNTER — TELEPHONE (OUTPATIENT)
Dept: PSYCHIATRY | Facility: CLINIC | Age: 45
End: 2025-06-24

## 2025-06-24 NOTE — TELEPHONE ENCOUNTER
NP referral to the [facility] received form pt's PCP for PCP intoxication. Called and left a VM for Donna regarding referral. [facility] office phone number provided.  Will try back at a later date.    For your review.

## 2025-06-27 ENCOUNTER — TELEPHONE (OUTPATIENT)
Dept: PSYCHIATRY | Facility: CLINIC | Age: 45
End: 2025-06-27

## 2025-06-27 NOTE — TELEPHONE ENCOUNTER
Donna called back and left a VM regarding referral. I called her back and left a VM. [facility] Office number provided.

## 2025-06-27 NOTE — TELEPHONE ENCOUNTER
Donna called back. Donna is interested in seeing the [facility] therapist for the [facility]. Donna states that she last used PCP about 2 months ago.  Donna states that she has psych diagnoses of anxiety, depression and BP 1 D/O. She feels safe, has no current SI/HI, nor any community treatment team. Address and insurance reviewed. Scheduled Donna to see Mary GOMEZ CM, for intake on 7/8/25 at 1 pm.    For your review.

## 2025-07-31 DIAGNOSIS — Z59.71 DOES NOT HAVE HEALTH INSURANCE: Primary | ICD-10-CM

## 2025-08-05 ENCOUNTER — PATIENT OUTREACH (OUTPATIENT)
Dept: CASE MANAGEMENT | Facility: OTHER | Age: 45
End: 2025-08-05

## 2025-08-13 ENCOUNTER — PATIENT OUTREACH (OUTPATIENT)
Dept: CASE MANAGEMENT | Facility: OTHER | Age: 45
End: 2025-08-13

## 2025-08-20 ENCOUNTER — PATIENT OUTREACH (OUTPATIENT)
Dept: CASE MANAGEMENT | Facility: OTHER | Age: 45
End: 2025-08-20